# Patient Record
Sex: FEMALE | Race: WHITE | NOT HISPANIC OR LATINO | Employment: OTHER
[De-identification: names, ages, dates, MRNs, and addresses within clinical notes are randomized per-mention and may not be internally consistent; named-entity substitution may affect disease eponyms.]

---

## 2020-03-02 ENCOUNTER — HEALTH MAINTENANCE LETTER (OUTPATIENT)
Age: 75
End: 2020-03-02

## 2020-12-14 ENCOUNTER — HEALTH MAINTENANCE LETTER (OUTPATIENT)
Age: 75
End: 2020-12-14

## 2021-04-18 ENCOUNTER — HEALTH MAINTENANCE LETTER (OUTPATIENT)
Age: 76
End: 2021-04-18

## 2021-10-02 ENCOUNTER — HEALTH MAINTENANCE LETTER (OUTPATIENT)
Age: 76
End: 2021-10-02

## 2022-03-19 ENCOUNTER — HEALTH MAINTENANCE LETTER (OUTPATIENT)
Age: 77
End: 2022-03-19

## 2022-05-14 ENCOUNTER — HEALTH MAINTENANCE LETTER (OUTPATIENT)
Age: 77
End: 2022-05-14

## 2022-09-03 ENCOUNTER — HEALTH MAINTENANCE LETTER (OUTPATIENT)
Age: 77
End: 2022-09-03

## 2023-06-03 ENCOUNTER — HEALTH MAINTENANCE LETTER (OUTPATIENT)
Age: 78
End: 2023-06-03

## 2024-07-06 ENCOUNTER — HEALTH MAINTENANCE LETTER (OUTPATIENT)
Age: 79
End: 2024-07-06

## 2024-10-16 ENCOUNTER — HOSPITAL ENCOUNTER (EMERGENCY)
Facility: HOSPITAL | Age: 79
Discharge: HOME | End: 2024-10-16
Attending: EMERGENCY MEDICINE | Admitting: EMERGENCY MEDICINE
Payer: COMMERCIAL

## 2024-10-16 VITALS
DIASTOLIC BLOOD PRESSURE: 56 MMHG | RESPIRATION RATE: 23 BRPM | TEMPERATURE: 97.3 F | WEIGHT: 170 LBS | SYSTOLIC BLOOD PRESSURE: 123 MMHG | HEART RATE: 79 BPM | OXYGEN SATURATION: 94 %

## 2024-10-16 DIAGNOSIS — I48.91 NEW ONSET ATRIAL FIBRILLATION (H): ICD-10-CM

## 2024-10-16 LAB
ANION GAP SERPL CALCULATED.3IONS-SCNC: 13 MMOL/L (ref 7–15)
BASOPHILS # BLD AUTO: 0 10E3/UL (ref 0–0.2)
BASOPHILS NFR BLD AUTO: 0 %
BUN SERPL-MCNC: 11.4 MG/DL (ref 8–23)
CALCIUM SERPL-MCNC: 9.4 MG/DL (ref 8.8–10.4)
CHLORIDE SERPL-SCNC: 102 MMOL/L (ref 98–107)
CREAT SERPL-MCNC: 0.96 MG/DL (ref 0.51–0.95)
EGFRCR SERPLBLD CKD-EPI 2021: 61 ML/MIN/1.73M2
EOSINOPHIL # BLD AUTO: 0.1 10E3/UL (ref 0–0.7)
EOSINOPHIL NFR BLD AUTO: 1 %
ERYTHROCYTE [DISTWIDTH] IN BLOOD BY AUTOMATED COUNT: 13.8 % (ref 10–15)
GLUCOSE SERPL-MCNC: 111 MG/DL (ref 70–99)
HCO3 SERPL-SCNC: 25 MMOL/L (ref 22–29)
HCT VFR BLD AUTO: 40.3 % (ref 35–47)
HGB BLD-MCNC: 12.9 G/DL (ref 11.7–15.7)
IMM GRANULOCYTES # BLD: 0.1 10E3/UL
IMM GRANULOCYTES NFR BLD: 1 %
LYMPHOCYTES # BLD AUTO: 2 10E3/UL (ref 0.8–5.3)
LYMPHOCYTES NFR BLD AUTO: 18 %
MAGNESIUM SERPL-MCNC: 2 MG/DL (ref 1.7–2.3)
MCH RBC QN AUTO: 29.5 PG (ref 26.5–33)
MCHC RBC AUTO-ENTMCNC: 32 G/DL (ref 31.5–36.5)
MCV RBC AUTO: 92 FL (ref 78–100)
MONOCYTES # BLD AUTO: 1.2 10E3/UL (ref 0–1.3)
MONOCYTES NFR BLD AUTO: 11 %
NEUTROPHILS # BLD AUTO: 7.5 10E3/UL (ref 1.6–8.3)
NEUTROPHILS NFR BLD AUTO: 69 %
NRBC # BLD AUTO: 0 10E3/UL
NRBC BLD AUTO-RTO: 0 /100
PLATELET # BLD AUTO: 258 10E3/UL (ref 150–450)
POTASSIUM SERPL-SCNC: 4.2 MMOL/L (ref 3.4–5.3)
RBC # BLD AUTO: 4.38 10E6/UL (ref 3.8–5.2)
SODIUM SERPL-SCNC: 140 MMOL/L (ref 135–145)
TROPONIN T SERPL HS-MCNC: <6 NG/L
TSH SERPL DL<=0.005 MIU/L-ACNC: 3.06 UIU/ML (ref 0.3–4.2)
WBC # BLD AUTO: 10.8 10E3/UL (ref 4–11)

## 2024-10-16 PROCEDURE — 84484 ASSAY OF TROPONIN QUANT: CPT | Performed by: EMERGENCY MEDICINE

## 2024-10-16 PROCEDURE — 83735 ASSAY OF MAGNESIUM: CPT | Performed by: EMERGENCY MEDICINE

## 2024-10-16 PROCEDURE — 85004 AUTOMATED DIFF WBC COUNT: CPT | Performed by: EMERGENCY MEDICINE

## 2024-10-16 PROCEDURE — 99284 EMERGENCY DEPT VISIT MOD MDM: CPT

## 2024-10-16 PROCEDURE — 36415 COLL VENOUS BLD VENIPUNCTURE: CPT | Performed by: EMERGENCY MEDICINE

## 2024-10-16 PROCEDURE — 93005 ELECTROCARDIOGRAM TRACING: CPT | Performed by: EMERGENCY MEDICINE

## 2024-10-16 PROCEDURE — 93005 ELECTROCARDIOGRAM TRACING: CPT | Performed by: STUDENT IN AN ORGANIZED HEALTH CARE EDUCATION/TRAINING PROGRAM

## 2024-10-16 PROCEDURE — 84443 ASSAY THYROID STIM HORMONE: CPT | Performed by: EMERGENCY MEDICINE

## 2024-10-16 PROCEDURE — 80048 BASIC METABOLIC PNL TOTAL CA: CPT | Performed by: EMERGENCY MEDICINE

## 2024-10-16 RX ORDER — METOPROLOL TARTRATE 25 MG/1
12.5 TABLET, FILM COATED ORAL 2 TIMES DAILY
Qty: 60 TABLET | Refills: 0 | Status: SHIPPED | OUTPATIENT
Start: 2024-10-16 | End: 2024-10-25

## 2024-10-16 ASSESSMENT — ACTIVITIES OF DAILY LIVING (ADL): ADLS_ACUITY_SCORE: 35

## 2024-10-16 ASSESSMENT — COLUMBIA-SUICIDE SEVERITY RATING SCALE - C-SSRS
6. HAVE YOU EVER DONE ANYTHING, STARTED TO DO ANYTHING, OR PREPARED TO DO ANYTHING TO END YOUR LIFE?: NO
1. IN THE PAST MONTH, HAVE YOU WISHED YOU WERE DEAD OR WISHED YOU COULD GO TO SLEEP AND NOT WAKE UP?: NO
2. HAVE YOU ACTUALLY HAD ANY THOUGHTS OF KILLING YOURSELF IN THE PAST MONTH?: NO

## 2024-10-16 NOTE — ED PROVIDER NOTES
EMERGENCY DEPARTMENT ENCOUNTER      NAME: Linda K Myhre  AGE: 79 year old female  YOB: 1945  MRN: [id number]  EVALUATION DATE & TIME: 10/16/2024  3:08 PM    PCP: No Ref-Primary, Physician    ED PROVIDER: Chris Garcia M.D.      Chief Complaint   Patient presents with    Atrial Fib       FINAL IMPRESSION:  1. New onset atrial fibrillation (H)        ED COURSE & MEDICAL DECISION MAKIN year old female presents to the Emergency Department for evaluation of atrial fibrillation.  Patient presenting with palpitations and episode of lightheadedness earlier.  She is initially somewhat tachycardic and EKG shows atrial fibrillation.  Patient underwent lab evaluations which included metabolic profile, thyroid testing, high-sensitivity troponin, all of this has been reassuring so far.  When placed on cardiac monitor, her overall heart rate trend is controlled.  We discussed starting her on a very  low-dose of metoprolol given the intermittent tachycardia as well as initiating anticoagulation with Eliquis.  A cardiology referral was placed.  Patient otherwise stable here throughout the duration of her workup and monitoring.  She was discharged in stable condition.    3:04 PM I met with the patient, obtained history, performed an initial exam, and discussed options and plan for diagnostics and treatment here in the ED.    At the conclusion of the encounter I discussed the results of all of the tests and the disposition. The questions were answered. The patient or family acknowledged understanding and was agreeable with the care plan.       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 Mask placed on patient in first look triage. Triage staff wearing masks.      independently?: Independent interpretation of ECG and images noted in documentation, when applicable.  Consultation discussion with other provider:Did you involve another provider (consultant, , pharmacy, etc.)?: No  Discharge. I prescribed additional prescription strength medication(s) as charted. N/A.    MIPS: Not Applicable          MEDICATIONS GIVEN IN THE EMERGENCY:  Medications - No data to display    NEW PRESCRIPTIONS STARTED AT TODAY'S ER VISIT  Discharge Medication List as of 10/16/2024  4:20 PM        START taking these medications    Details   apixaban ANTICOAGULANT (ELIQUIS ANTICOAGULANT) 5 MG tablet Take 1 tablet (5 mg) by mouth 2 times daily., Disp-60 tablet, R-0, Local Print      metoprolol tartrate (LOPRESSOR) 25 MG tablet Take 0.5 tablets (12.5 mg) by mouth 2 times daily., Disp-60 tablet, R-0, Local Print                =================================================================    HPI    Patient information was obtained from: Patient    Use of : N/A        Linda K Myhre is a 79 year old female with no pertinent history who presents to this ED via EMS for evaluation of atrial fibrillation.     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 She reports a potential loss of consciousness, stating all of a sudden she woke up with her fellow volunteers surrounding her. Patient reports feeling at baseline now, and denies any history of heart problems, other than two instances of Chorea [place] when she was a child.     Patient denies history of heavy drinking or any other complaints at this time.       REVIEW OF SYSTEMS   All systems reviewed and negative except as noted in HPI.    PAST MEDICAL HISTORY:  No past medical history on file.    PAST SURGICAL HISTORY:  No past surgical history on file.        CURRENT MEDICATIONS:    No current facility-administered medications for this encounter.     Current  Outpatient Medications   Medication Sig Dispense Refill    apixaban ANTICOAGULANT (ELIQUIS ANTICOAGULANT) 5 MG tablet Take 1 tablet (5 mg) by mouth 2 times daily. 60 tablet 0    metoprolol tartrate (LOPRESSOR) 25 MG tablet Take 0.5 tablets (12.5 mg) by mouth 2 times daily. 60 tablet 0         ALLERGIES:  No Known Allergies    FAMILY HISTORY:  No family history on file.    SOCIAL HISTORY:   Social History     Socioeconomic History    Marital status:    Tobacco Use    Smoking status: Former   Substance and Sexual Activity    Alcohol use: No    Drug use: No       VITALS:  /56   Pulse 79   Temp 97.3  F (36.3  C)   Resp 23   Wt 77.1 kg (170 lb)   SpO2 94%     PHYSICAL EXAM    Constitutional: Well developed, Well nourished, NAD.  HENT: Normocephalic, Atraumatic. Neck Supple.  Eyes: EOMI, Conjunctiva normal.  Respiratory: Breathing comfortably on room air. Speaks full sentences easily. Lungs clear to ascultation.  Cardiovascular: Irregular heart rate, Irregular rhythm. No peripheral edema.  Abdomen: Soft  Musculoskeletal: Good range of motion in all major joints. No major deformities noted.  Integument: Warm, Dry.  Neurologic: Alert & awake, Normal motor function, Normal sensory function, No focal deficits noted.   Psychiatric: Cooperative. Affect appropriate.     LAB:  All pertinent labs reviewed and interpreted.  Labs Ordered and Resulted from Time of ED Arrival to Time of ED Departure   BASIC METABOLIC PANEL - Abnormal       Result Value    Sodium 140      Potassium 4.2      Chloride 102      Carbon Dioxide (CO2) 25      Anion Gap 13      Urea Nitrogen 11.4      Creatinine 0.96 (*)     GFR Estimate 61      Calcium 9.4      Glucose 111 (*)    TROPONIN T, HIGH SENSITIVITY - Normal    Troponin T, High Sensitivity <6     TSH WITH FREE T4 REFLEX - Normal    TSH 3.06     MAGNESIUM - Normal    Magnesium 2.0     CBC WITH PLATELETS AND DIFFERENTIAL    WBC Count 10.8      RBC Count 4.38      Hemoglobin 12.9       Hematocrit 40.3      MCV 92      MCH 29.5      MCHC 32.0      RDW 13.8      Platelet Count 258      % Neutrophils 69      % Lymphocytes 18      % Monocytes 11      % Eosinophils 1      % Basophils 0      % Immature Granulocytes 1      NRBCs per 100 WBC 0      Absolute Neutrophils 7.5      Absolute Lymphocytes 2.0      Absolute Monocytes 1.2      Absolute Eosinophils 0.1      Absolute Basophils 0.0      Absolute Immature Granulocytes 0.1      Absolute NRBCs 0.0           EKG:    Performed at: 1442    Impression: Atrial fibrillation with RVR, possible anterior infarct pattern, age-indeterminate    Rate: 103  Rhythm: Afib  Axis: Normal  MO Interval: NA  QRS Interval: 92  QTc Interval: 445  ST Changes: None  Comparison: None available    I have independently reviewed and interpreted the EKG(s) documented above.      I, Antwon Khoury, am serving as a scribe to document services personally performed by Dr. Chris Garcia, based on my observation and the provider's statements to me. I, Chris Garcia MD attest that Antwon Khoury is acting in a scribe capacity, has observed my performance of the services and has documented them in accordance with my direction.    Chris Garcia M.D.  Emergency Medicine  [facility] EMERGENCY DEPARTMENT  [facility] [facility] [id number] [phone]  Dept: [phone]       Chris Garcia MD  10/16/24 5266

## 2024-10-16 NOTE — ED TRIAGE NOTES
Pt had sudden onset of feeling hot, dizzy and had near syncope. Pt found per EMS to be in Afib RVR. No previous hx of.      Triage Assessment (Adult)       Row Name 10/16/24 3231          Triage Assessment    Airway WDL WDL        Respiratory WDL    Respiratory WDL WDL        Skin Circulation/Temperature WDL    Skin Circulation/Temperature WDL WDL        Cardiac WDL    Cardiac WDL X        Peripheral/Neurovascular WDL    Peripheral Neurovascular WDL WDL

## 2024-10-16 NOTE — DISCHARGE INSTRUCTIONS
You were seen in the emergency department for palpitations and lightheadedness and found to have new onset of atrial fibrillation.  Your evaluation otherwise with lab testing here looks stable.  Your heart rate at this time is stable.  We discussed that we are going to start you on a low-dose of medication to help manage heart rate as well as a blood thinner to decrease stroke risk associated with atrial fibrillation.  We are going to place a referral to the cardiology clinic for follow-up as soon as possible.  Please avoid caffeine and alcohol.  Make sure you are drinking plenty of liquids to keep yourself well hydrated.  If you have any concerns like severe sustained lightheadedness, severe evolving chest pain, etc. we can reevaluate anytime in the emergency department.

## 2024-10-25 ENCOUNTER — OFFICE VISIT (OUTPATIENT)
Dept: CARDIOLOGY | Facility: CLINIC | Age: 79
End: 2024-10-25
Attending: EMERGENCY MEDICINE
Payer: COMMERCIAL

## 2024-10-25 VITALS
RESPIRATION RATE: 22 BRPM | HEART RATE: 75 BPM | SYSTOLIC BLOOD PRESSURE: 138 MMHG | BODY MASS INDEX: 27.48 KG/M2 | HEIGHT: 66 IN | WEIGHT: 171 LBS | DIASTOLIC BLOOD PRESSURE: 68 MMHG

## 2024-10-25 DIAGNOSIS — I48.91 NEW ONSET ATRIAL FIBRILLATION (H): Primary | ICD-10-CM

## 2024-10-25 PROCEDURE — 99204 OFFICE O/P NEW MOD 45 MIN: CPT | Performed by: INTERNAL MEDICINE

## 2024-10-25 PROCEDURE — 93242 EXT ECG>48HR<7D RECORDING: CPT | Performed by: INTERNAL MEDICINE

## 2024-10-25 PROCEDURE — G2211 COMPLEX E/M VISIT ADD ON: HCPCS | Performed by: INTERNAL MEDICINE

## 2024-10-25 RX ORDER — METOPROLOL TARTRATE 25 MG/1
25 TABLET, FILM COATED ORAL 2 TIMES DAILY
Qty: 60 TABLET | Refills: 11 | Status: SHIPPED | OUTPATIENT
Start: 2024-10-25

## 2024-10-25 NOTE — NURSING NOTE
Linda K Myhre arrived here on 10/25/2024 10:53 AM for 3-7 Days  Zio monitor placement per ordering provider Dr. Calderon for the diagnosis New onset atrial fibrillation (H) [I48.91] .  Patient s skin was prepped per protocol. Dr. Calderon is the supervising MD.  Zio monitor was placed.  Instructions were reviewed with and given to the patient.  Patient verbalized understanding of wear, troubleshooting and monitor return instructions.

## 2024-10-25 NOTE — LETTER
10/25/2024    Physician No Ref-Primary  No address on file    RE: Linda K Myhre       Dear Colleague,     I had the pleasure of seeing Linda K Myhre in the [facility].      Click to link to [facility] HEART CARE NOTE    Thank you, Dr. Garcia, for asking me to see Linda K Myhre in consultation at [facility] to evaluate .      Assessment/Plan:   New onset atrial fibrillation: The patient has no palpitations.  Her atrial fibrillation symptoms include fatigue, mild shortness of breath, dizziness.  The etiology of her atrial fibrillation could be related to recent lung infection possible viral versus bacteria.  Her TSH is normal.  She has no chronic lung disease, no history of heart disease.  We discussed the evaluation and management of atrial fibrillation.  Since she had a syncopal episode on October 16 and new onset atrial fibrillation, Zio patch monitor 7 days is requested for evaluation of atrial fibrillation burden, ventricular rate response.  Meantime, start metoprolol titrate 25 mg twice a day for better rate control.  Echocardiogram is requested for evaluation of heart function and structure. Her [id number] score is 3 due to age and female.  Continue Eliquis 5 mg twice a day.  We discussed the side effect and the benefit of chronic anticoagulation.  We also discussed alternative prevention of stroke by Watchman device.  We discussed the management of atrial fibrillation including ablation.  Based on Zio patch monitor and echo findings, we will further discuss the management of atrial fibrillation.    We will follow-up her Zio patch monitor and echo report, discuss the findings with the patient.    Thank you for the opportunity to be involved in the care of Linda K Myhre. If you have any questions, please feel free to contact me.  I will see the patient again in 2 months and as needed    Much or all of the text in this note was generated  through the use of Dragon Dictate voice-to-text software. Errors in spelling or words which seem out of context are unintentional.   Sound alike errors, in particular, may have escaped editing.       History of Present Illness:   It is my pleasure to see Linda K Myhre at the [facility] clinic for evaluation of New Patient. Linda K Myhre is a 79 year old female with a medical history of new onset atrial fibrillation.    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  Suddenly she developed lightheadedness, dizziness, fatigue and shortness of breath.  And then she passed out for short time.  The patient was brought to emergency room and found atrial fibrillation with RVR.    She got viral infection 10 days ago.  She developed a cough.  She denies chest pain.  She had no shortness of breath on exertion, palpitations.  She had no orthopnea, PND or leg edema.  Her blood pressure and heart rate are controlled today.  She has been started blood thinner Eliquis of 5 mg twice a day.    Past Medical History:     Patient Active Problem List   Diagnosis     Hemorrhoids       Past Surgical History:   No past surgical history on file.    Family History:   Reviewed: Family history of coronary artery disease, no family history of atrial fibrillation.    Social History:    reports that she has quit smoking. She does not have any smokeless tobacco history on file. She reports that she does not drink alcohol and does not use drugs.    Review of Systems:   12 systems are reviewed negative except for in HPI.    Meds:     Current Outpatient Medications:      apixaban ANTICOAGULANT (ELIQUIS ANTICOAGULANT) 5 MG tablet, Take 1 tablet (5 mg) by mouth 2 times daily., Disp: 60 tablet, Rfl: 11     metoprolol tartrate (LOPRESSOR) 25 MG tablet, Take 1 tablet (25 mg) by mouth 2 times daily., Disp: 60 tablet, Rfl: 11     Allergies:   Patient has no known allergies.    Objective:      Physical Exam  77.6 kg (171 lb)  1.676 m (5'  "6\")  Body mass index is 27.6 kg/m .  /68 (BP Location: Right arm, Patient Position: Sitting, Cuff Size: Adult Regular)   Pulse 75   Resp 22   Ht 1.676 m (5' 6\")   Wt 77.6 kg (171 lb)   BMI 27.60 kg/m      General Appearance:   Awake, Alert, No acute distress.   HEENT:  Pupil equal, reactive to light. No scleral icterus; the mucous membranes were moist. No oral ulcers or thrush.    Neck: No cervical bruits. No JVD. No thyromegaly. No lymph node enlargement or tenderness.   Chest: The spine was straight. The chest was symmetric.   Lungs:   Respirations unlabored. Lungs are clear to auscultation. No crackles. No wheezing.   Cardiovascular:   IRRR, normal first and second heart sounds with no murmurs. No rubs or gallops.    Abdomen:  Soft. No tenderness. Non-distended. Bowels sounds are present   Extremities: Equal posterior tibial pulses. No leg edema.   Skin: No rashes or ulcers. Warm, Dry.   Musculoskeletal: No tenderness. No deformity.   Neurologic: Mood and affect are appropriate. No focal deficits.         EKG:  Personally reivewed  Atrial fibrillation with rapid ventricular response  Possible anterior infarct, age undetermined  Abnormal ECG  No previous ECG for comparison    Cardiac Imaging Studies       Lab Review   Lab Results   Component Value Date     10/16/2024    .0 04/09/2013    CO2 25 10/16/2024    CO2 29.0 04/09/2013    BUN 11.4 10/16/2024    BUN 14.0 04/09/2013     Lab Results   Component Value Date    WBC 10.8 10/16/2024    HGB 12.9 10/16/2024    HCT 40.3 10/16/2024    MCV 92 10/16/2024     10/16/2024     Lab Results   Component Value Date    CHOL 189 05/02/2012    HDL 59 05/02/2012     05/02/2012     LDL Cholesterol Calculated   Date Value Ref Range Status   05/02/2012 112 <130 mg/dL Final     Lab Results   Component Value Date    TSH 3.06 10/16/2024                   Thank you for allowing me to participate in the care of your patient.      Sincerely,     Janie" MD Yumiko     [facility]  cc:   Chris Garcia MD  [address] [place] [id number]

## 2024-10-25 NOTE — PROGRESS NOTES
Click to link to [facility] HEART CARE NOTE    Thank you, Dr. Garcia, for asking me to see Linda K Myhre in consultation at [facility] to evaluate .      Assessment/Plan:   New onset atrial fibrillation: The patient has no palpitations.  Her atrial fibrillation symptoms include fatigue, mild shortness of breath, dizziness.  The etiology of her atrial fibrillation could be related to recent lung infection possible viral versus bacteria.  Her TSH is normal.  She has no chronic lung disease, no history of heart disease.  We discussed the evaluation and management of atrial fibrillation.  Since she had a syncopal episode on October 16 and new onset atrial fibrillation, Zio patch monitor 7 days is requested for evaluation of atrial fibrillation burden, ventricular rate response.  Meantime, start metoprolol titrate 25 mg twice a day for better rate control.  Echocardiogram is requested for evaluation of heart function and structure. Her [id number] score is 3 due to age and female.  Continue Eliquis 5 mg twice a day.  We discussed the side effect and the benefit of chronic anticoagulation.  We also discussed alternative prevention of stroke by Watchman device.  We discussed the management of atrial fibrillation including ablation.  Based on Zio patch monitor and echo findings, we will further discuss the management of atrial fibrillation.    We will follow-up her Zio patch monitor and echo report, discuss the findings with the patient.    Thank you for the opportunity to be involved in the care of Linda K Myhre. If you have any questions, please feel free to contact me.  I will see the patient again in 2 months and as needed    Much or all of the text in this note was generated through the use of Dragon Dictate voice-to-text software. Errors in spelling or words which seem out of context are unintentional.   Sound alike errors, in particular, may have escaped  "editing.       History of Present Illness:   It is my pleasure to see Linda K Myhre at the [facility] clinic for evaluation of New Patient. Linda K Myhre is a 79 year old female with a medical history of new onset atrial fibrillation.    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  Suddenly she developed lightheadedness, dizziness, fatigue and shortness of breath.  And then she passed out for short time.  The patient was brought to emergency room and found atrial fibrillation with RVR.    She got viral infection 10 days ago.  She developed a cough.  She denies chest pain.  She had no shortness of breath on exertion, palpitations.  She had no orthopnea, PND or leg edema.  Her blood pressure and heart rate are controlled today.  She has been started blood thinner Eliquis of 5 mg twice a day.    Past Medical History:     Patient Active Problem List   Diagnosis    Hemorrhoids       Past Surgical History:   No past surgical history on file.    Family History:   Reviewed: Family history of coronary artery disease, no family history of atrial fibrillation.    Social History:    reports that she has quit smoking. She does not have any smokeless tobacco history on file. She reports that she does not drink alcohol and does not use drugs.    Review of Systems:   12 systems are reviewed negative except for in HPI.    Meds:     Current Outpatient Medications:     apixaban ANTICOAGULANT (ELIQUIS ANTICOAGULANT) 5 MG tablet, Take 1 tablet (5 mg) by mouth 2 times daily., Disp: 60 tablet, Rfl: 11    metoprolol tartrate (LOPRESSOR) 25 MG tablet, Take 1 tablet (25 mg) by mouth 2 times daily., Disp: 60 tablet, Rfl: 11     Allergies:   Patient has no known allergies.    Objective:      Physical Exam  77.6 kg (171 lb)  1.676 m (5' 6\")  Body mass index is 27.6 kg/m .  /68 (BP Location: Right arm, Patient Position: Sitting, Cuff Size: Adult Regular)   Pulse 75   Resp 22   Ht 1.676 m (5' 6\")   Wt 77.6 kg (171 " lb)   BMI 27.60 kg/m      General Appearance:   Awake, Alert, No acute distress.   HEENT:  Pupil equal, reactive to light. No scleral icterus; the mucous membranes were moist. No oral ulcers or thrush.    Neck: No cervical bruits. No JVD. No thyromegaly. No lymph node enlargement or tenderness.   Chest: The spine was straight. The chest was symmetric.   Lungs:   Respirations unlabored. Lungs are clear to auscultation. No crackles. No wheezing.   Cardiovascular:   IRRR, normal first and second heart sounds with no murmurs. No rubs or gallops.    Abdomen:  Soft. No tenderness. Non-distended. Bowels sounds are present   Extremities: Equal posterior tibial pulses. No leg edema.   Skin: No rashes or ulcers. Warm, Dry.   Musculoskeletal: No tenderness. No deformity.   Neurologic: Mood and affect are appropriate. No focal deficits.         EKG:  Personally reivewed  Atrial fibrillation with rapid ventricular response  Possible anterior infarct, age undetermined  Abnormal ECG  No previous ECG for comparison    Cardiac Imaging Studies       Lab Review   Lab Results   Component Value Date     10/16/2024    .0 04/09/2013    CO2 25 10/16/2024    CO2 29.0 04/09/2013    BUN 11.4 10/16/2024    BUN 14.0 04/09/2013     Lab Results   Component Value Date    WBC 10.8 10/16/2024    HGB 12.9 10/16/2024    HCT 40.3 10/16/2024    MCV 92 10/16/2024     10/16/2024     Lab Results   Component Value Date    CHOL 189 05/02/2012    HDL 59 05/02/2012     05/02/2012     LDL Cholesterol Calculated   Date Value Ref Range Status   05/02/2012 112 <130 mg/dL Final     Lab Results   Component Value Date    TSH 3.06 10/16/2024

## 2024-11-08 PROCEDURE — 93248 EXT ECG>7D<15D REV&INTERPJ: CPT | Performed by: INTERNAL MEDICINE

## 2024-11-10 LAB
ATRIAL RATE - MUSE: 94 BPM
DIASTOLIC BLOOD PRESSURE - MUSE: NORMAL MMHG
INTERPRETATION ECG - MUSE: NORMAL
P AXIS - MUSE: NORMAL DEGREES
PR INTERVAL - MUSE: NORMAL MS
QRS DURATION - MUSE: 92 MS
QT - MUSE: 340 MS
QTC - MUSE: 445 MS
R AXIS - MUSE: -11 DEGREES
SYSTOLIC BLOOD PRESSURE - MUSE: NORMAL MMHG
T AXIS - MUSE: 30 DEGREES
VENTRICULAR RATE- MUSE: 103 BPM

## 2024-11-22 ENCOUNTER — HOSPITAL ENCOUNTER (OUTPATIENT)
Dept: CARDIOLOGY | Facility: HOSPITAL | Age: 79
Discharge: HOME | End: 2024-11-22
Attending: INTERNAL MEDICINE | Admitting: INTERNAL MEDICINE
Payer: COMMERCIAL

## 2024-11-22 DIAGNOSIS — I48.91 NEW ONSET ATRIAL FIBRILLATION (H): ICD-10-CM

## 2024-11-22 LAB — LVEF ECHO: NORMAL

## 2024-11-22 PROCEDURE — 93306 TTE W/DOPPLER COMPLETE: CPT | Mod: 26 | Performed by: INTERNAL MEDICINE

## 2024-11-22 PROCEDURE — 255N000002 HC RX 255 OP 636: Performed by: INTERNAL MEDICINE

## 2024-11-22 RX ADMIN — PERFLUTREN 2 ML: 6.52 INJECTION, SUSPENSION INTRAVENOUS at 09:47

## 2024-12-17 ENCOUNTER — OFFICE VISIT (OUTPATIENT)
Dept: CARDIOLOGY | Facility: CLINIC | Age: 79
End: 2024-12-17
Attending: INTERNAL MEDICINE
Payer: COMMERCIAL

## 2024-12-17 VITALS
HEART RATE: 64 BPM | WEIGHT: 175 LBS | SYSTOLIC BLOOD PRESSURE: 152 MMHG | BODY MASS INDEX: 28.12 KG/M2 | DIASTOLIC BLOOD PRESSURE: 62 MMHG | RESPIRATION RATE: 16 BRPM | HEIGHT: 66 IN

## 2024-12-17 DIAGNOSIS — R93.1 ABNORMAL ECHOCARDIOGRAM: ICD-10-CM

## 2024-12-17 DIAGNOSIS — I48.91 NEW ONSET ATRIAL FIBRILLATION (H): Primary | ICD-10-CM

## 2024-12-17 DIAGNOSIS — I25.5 ISCHEMIC CARDIOMYOPATHY: ICD-10-CM

## 2024-12-17 PROCEDURE — 99214 OFFICE O/P EST MOD 30 MIN: CPT | Performed by: INTERNAL MEDICINE

## 2024-12-17 NOTE — PROGRESS NOTES
Click to link to [facility] HEART CARE NOTE     Assessment/Plan:   New onset atrial fibrillation: The patient has no palpitations.  She is in persistent atrial fibrillation with controlled ventricular rate based on Zio patch monitor.  Echo was reported normal left ventricular size and systolic function, wall motion abnormality in distal LAD distribution.  We discussed the evaluation and management.  We discussed ablation of atrial fibrillation.  Since she has segmental wall motion abnormality, stress cardiac MRI is requested for further evaluation prior to considering ablation of atrial fibrillation.  Continue metoprolol 25 mg twice a day. Her [id number] score is 3 due to age and female.  Continue Eliquis 5 mg twice a day.  Abnormal echo findings, segmental wall motion abnormality in LAD distribution: The patient has no chest pain.  Her exertional shortness of breath could be anginal equivalent.  We discussed the findings of echo.  After discussion, stress cardiac MRI is requested for evaluation of myocardial ischemia, myocardial viability, other etiology of cardiomyopathy.  Depend on stress cardiac MRI findings, we will discuss next step.    Thank you for the opportunity to be involved in the care of Linda K Myhre. If you have any questions, please feel free to contact me.  I will see the patient again in 2 months and as needed    Much or all of the text in this note was generated through the use of Dragon Dictate voice-to-text software. Errors in spelling or words which seem out of context are unintentional.   Sound alike errors, in particular, may have escaped editing.       History of Present Illness:   It is my pleasure to see Linda K Myhre at the [facility] clinic for routine cardiology follow-up. Linda K Myhre is a 79 year old female with a medical history of new onset atrial fibrillation.    The patient states that she has been doing well since last visit  "2 months ago.  She has occasional exertional shortness of breath.  She denies chest pain, palpitations, dizziness, lightheadedness, syncope, orthopnea, PND or leg edema.  Her blood pressure and heart rate are controlled.    Her Zio patch monitor was reported continues atrial fibrillation with controlled ventricular rate.  Echocardiogram was reported normal left ventricular size, LVEF of 60 to 65%, significant hypokinesis in distal anteroseptal and apical segments, mild aortic valve regurgitation.    Past Medical History:     Patient Active Problem List   Diagnosis    Hemorrhoids    New onset atrial fibrillation (H)       Past Surgical History:   No past surgical history on file.    Family History:   Reviewed: Family history of coronary artery disease, no family history of atrial fibrillation.    Social History:    reports that she has quit smoking. She does not have any smokeless tobacco history on file. She reports that she does not drink alcohol and does not use drugs.    Review of Systems:   12 systems are reviewed negative except for in HPI.    Meds:     Current Outpatient Medications:     apixaban ANTICOAGULANT (ELIQUIS ANTICOAGULANT) 5 MG tablet, Take 1 tablet (5 mg) by mouth 2 times daily., Disp: 60 tablet, Rfl: 11    metoprolol tartrate (LOPRESSOR) 25 MG tablet, Take 1 tablet (25 mg) by mouth 2 times daily., Disp: 60 tablet, Rfl: 11     Allergies:   Patient has no known allergies.    Objective:      Physical Exam  79.4 kg (175 lb)  1.676 m (5' 6\")  Body mass index is 28.25 kg/m .  BP (!) 152/62 (BP Location: Right arm, Patient Position: Sitting, Cuff Size: Adult Regular)   Pulse 64   Resp 16   Ht 1.676 m (5' 6\")   Wt 79.4 kg (175 lb)   BMI 28.25 kg/m      General Appearance:   Awake, Alert, No acute distress.   HEENT:  Pupil equal, reactive to light. No scleral icterus; the mucous membranes were moist. No oral ulcers or thrush.    Neck: No cervical bruits. No JVD. No thyromegaly. No lymph node " enlargement or tenderness.   Chest: The spine was straight. The chest was symmetric.   Lungs:   Respirations unlabored. Lungs are clear to auscultation. No crackles. No wheezing.   Cardiovascular:   IRRR, normal first and second heart sounds with no murmurs. No rubs or gallops.    Abdomen:  Soft. No tenderness. Non-distended. Bowels sounds are present   Extremities: Equal posterior tibial pulses. No leg edema.   Skin: No rashes or ulcers. Warm, Dry.   Musculoskeletal: No tenderness. No deformity.   Neurologic: Mood and affect are appropriate. No focal deficits.         EKG:  Personally reivewed  Atrial fibrillation with rapid ventricular response  Possible anterior infarct, age undetermined  Abnormal ECG  No previous ECG for comparison    Cardiac Imaging Studies  Echo on November 22, 2024:  1.Left ventricular size and function are normal. The ejection  fraction is 60-65%.  2.There is apical akinesis.  3.Normal right ventricle size and systolic function.  4.The left atrium is borderline dilated.  5.No hemodynamically significant valvular abnormalities on 2D or color flow  imaging, mild leaks as below.  There is no comparison study available.    Zio patch monitor on November 8, 2024:  Zio monitoring from 10/25/2024 to 11/1/2024 (duration 7d 12h).  Continuous atrial fibrillation, ventricular rates 41 to 168bpm, average 69bpm.  Intermittent IVCD.  There were no pauses of greater than 3 seconds.  Rare premature ventricular contractions (isolated <1%).  Symptom triggers (2) correlated to atrial fibrillation with ventricular rate 61-114bpm.    Lab Review   Lab Results   Component Value Date     10/16/2024    .0 04/09/2013    CO2 25 10/16/2024    CO2 29.0 04/09/2013    BUN 11.4 10/16/2024    BUN 14.0 04/09/2013     Lab Results   Component Value Date    WBC 10.8 10/16/2024    HGB 12.9 10/16/2024    HCT 40.3 10/16/2024    MCV 92 10/16/2024     10/16/2024     Lab Results   Component Value Date    CHOL 189  05/02/2012    HDL 59 05/02/2012     05/02/2012     LDL Cholesterol Calculated   Date Value Ref Range Status   05/02/2012 112 <130 mg/dL Final     Lab Results   Component Value Date    TSH 3.06 10/16/2024

## 2024-12-17 NOTE — LETTER
12/17/2024    Physician No Ref-Primary  No address on file    RE: Linda K Myhre       Dear Colleague,     I had the pleasure of seeing Linda K Myhre in the [facility].      Click to link to [facility] NOTE     Assessment/Plan:   New onset atrial fibrillation: The patient has no palpitations.  She is in persistent atrial fibrillation with controlled ventricular rate based on Zio patch monitor.  Echo was reported normal left ventricular size and systolic function, wall motion abnormality in distal LAD distribution.  We discussed the evaluation and management.  We discussed ablation of atrial fibrillation.  Since she has segmental wall motion abnormality, stress cardiac MRI is requested for further evaluation prior to considering ablation of atrial fibrillation.  Continue metoprolol 25 mg twice a day. Her [id number] score is 3 due to age and female.  Continue Eliquis 5 mg twice a day.  Abnormal echo findings, segmental wall motion abnormality in LAD distribution: The patient has no chest pain.  Her exertional shortness of breath could be anginal equivalent.  We discussed the findings of echo.  After discussion, stress cardiac MRI is requested for evaluation of myocardial ischemia, myocardial viability, other etiology of cardiomyopathy.  Depend on stress cardiac MRI findings, we will discuss next step.    Thank you for the opportunity to be involved in the care of Linda K Myhre. If you have any questions, please feel free to contact me.  I will see the patient again in 2 months and as needed    Much or all of the text in this note was generated through the use of Dragon Dictate voice-to-text software. Errors in spelling or words which seem out of context are unintentional.   Sound alike errors, in particular, may have escaped editing.       History of Present Illness:   It is my pleasure to see Linda K Myhre at the [facility] for  "routine cardiology follow-up. Linda K Myhre is a 79 year old female with a medical history of new onset atrial fibrillation.    The patient states that she has been doing well since last visit 2 months ago.  She has occasional exertional shortness of breath.  She denies chest pain, palpitations, dizziness, lightheadedness, syncope, orthopnea, PND or leg edema.  Her blood pressure and heart rate are controlled.    Her Zio patch monitor was reported continues atrial fibrillation with controlled ventricular rate.  Echocardiogram was reported normal left ventricular size, LVEF of 60 to 65%, significant hypokinesis in distal anteroseptal and apical segments, mild aortic valve regurgitation.    Past Medical History:     Patient Active Problem List   Diagnosis     Hemorrhoids     New onset atrial fibrillation (H)       Past Surgical History:   No past surgical history on file.    Family History:   Reviewed: Family history of coronary artery disease, no family history of atrial fibrillation.    Social History:    reports that she has quit smoking. She does not have any smokeless tobacco history on file. She reports that she does not drink alcohol and does not use drugs.    Review of Systems:   12 systems are reviewed negative except for in HPI.    Meds:     Current Outpatient Medications:      apixaban ANTICOAGULANT (ELIQUIS ANTICOAGULANT) 5 MG tablet, Take 1 tablet (5 mg) by mouth 2 times daily., Disp: 60 tablet, Rfl: 11     metoprolol tartrate (LOPRESSOR) 25 MG tablet, Take 1 tablet (25 mg) by mouth 2 times daily., Disp: 60 tablet, Rfl: 11     Allergies:   Patient has no known allergies.    Objective:      Physical Exam  79.4 kg (175 lb)  1.676 m (5' 6\")  Body mass index is 28.25 kg/m .  BP (!) 152/62 (BP Location: Right arm, Patient Position: Sitting, Cuff Size: Adult Regular)   Pulse 64   Resp 16   Ht 1.676 m (5' 6\")   Wt 79.4 kg (175 lb)   BMI 28.25 kg/m      General Appearance:   Awake, Alert, No acute distress. "   HEENT:  Pupil equal, reactive to light. No scleral icterus; the mucous membranes were moist. No oral ulcers or thrush.    Neck: No cervical bruits. No JVD. No thyromegaly. No lymph node enlargement or tenderness.   Chest: The spine was straight. The chest was symmetric.   Lungs:   Respirations unlabored. Lungs are clear to auscultation. No crackles. No wheezing.   Cardiovascular:   IRRR, normal first and second heart sounds with no murmurs. No rubs or gallops.    Abdomen:  Soft. No tenderness. Non-distended. Bowels sounds are present   Extremities: Equal posterior tibial pulses. No leg edema.   Skin: No rashes or ulcers. Warm, Dry.   Musculoskeletal: No tenderness. No deformity.   Neurologic: Mood and affect are appropriate. No focal deficits.         EKG:  Personally reivewed  Atrial fibrillation with rapid ventricular response  Possible anterior infarct, age undetermined  Abnormal ECG  No previous ECG for comparison    Cardiac Imaging Studies  Echo on November 22, 2024:  1.Left ventricular size and function are normal. The ejection  fraction is 60-65%.  2.There is apical akinesis.  3.Normal right ventricle size and systolic function.  4.The left atrium is borderline dilated.  5.No hemodynamically significant valvular abnormalities on 2D or color flow  imaging, mild leaks as below.  There is no comparison study available.    Zio patch monitor on November 8, 2024:  Zio monitoring from 10/25/2024 to 11/1/2024 (duration 7d 12h).  Continuous atrial fibrillation, ventricular rates 41 to 168bpm, average 69bpm.  Intermittent IVCD.  There were no pauses of greater than 3 seconds.  Rare premature ventricular contractions (isolated <1%).  Symptom triggers (2) correlated to atrial fibrillation with ventricular rate 61-114bpm.    Lab Review   Lab Results   Component Value Date     10/16/2024    .0 04/09/2013    CO2 25 10/16/2024    CO2 29.0 04/09/2013    BUN 11.4 10/16/2024    BUN 14.0 04/09/2013     Lab Results    Component Value Date    WBC 10.8 10/16/2024    HGB 12.9 10/16/2024    HCT 40.3 10/16/2024    MCV 92 10/16/2024     10/16/2024     Lab Results   Component Value Date    CHOL 189 05/02/2012    HDL 59 05/02/2012     05/02/2012     LDL Cholesterol Calculated   Date Value Ref Range Status   05/02/2012 112 <130 mg/dL Final     Lab Results   Component Value Date    TSH 3.06 10/16/2024                   Thank you for allowing me to participate in the care of your patient.      Sincerely,     Janie Calderon MD     [facility]  cc:   Janie Calderon MD  [address] [facility] [address]  [place] [id number]

## 2025-01-27 ENCOUNTER — HOSPITAL ENCOUNTER (OUTPATIENT)
Facility: HOSPITAL | Age: 80
End: 2025-01-27
Attending: STUDENT IN AN ORGANIZED HEALTH CARE EDUCATION/TRAINING PROGRAM | Admitting: STUDENT IN AN ORGANIZED HEALTH CARE EDUCATION/TRAINING PROGRAM
Payer: COMMERCIAL

## 2025-01-27 DIAGNOSIS — R06.09 DOE (DYSPNEA ON EXERTION): ICD-10-CM

## 2025-01-27 DIAGNOSIS — R94.39 ABNORMAL CARDIOVASCULAR STRESS TEST: ICD-10-CM

## 2025-01-27 NOTE — Clinical Note
I spoke to patient's nurse related to no COVID test showing up for admission on patient's chart. RN verbally provided me the information on patient's paper chart related to a COVID test she had done at another facility that is not listed in Care Everywhere.     Test completed at [facility]  Negative on 5/3/2021 1915    Patient does not need another test for admission screening. Refer to hardcopy on chart in the unit.          MOMO Peoples  05/05/21 [id number]     Sheath prepped and inserted in the right radial artery.

## 2025-01-28 ENCOUNTER — TELEPHONE (OUTPATIENT)
Dept: CARDIOLOGY | Facility: CLINIC | Age: 80
End: 2025-01-28
Payer: COMMERCIAL

## 2025-01-28 ENCOUNTER — PREP FOR PROCEDURE (OUTPATIENT)
Dept: CARDIOLOGY | Facility: CLINIC | Age: 80
End: 2025-01-28
Payer: COMMERCIAL

## 2025-01-28 DIAGNOSIS — R06.09 DOE (DYSPNEA ON EXERTION): Primary | ICD-10-CM

## 2025-01-28 DIAGNOSIS — R94.39 ABNORMAL CARDIOVASCULAR STRESS TEST: ICD-10-CM

## 2025-01-28 LAB
ABO + RH BLD: NORMAL
BLD GP AB SCN SERPL QL: NEGATIVE
SPECIMEN EXP DATE BLD: NORMAL

## 2025-01-28 RX ORDER — LIDOCAINE 40 MG/G
CREAM TOPICAL
OUTPATIENT
Start: 2025-01-28

## 2025-01-28 RX ORDER — SODIUM CHLORIDE 9 MG/ML
INJECTION, SOLUTION INTRAVENOUS CONTINUOUS
OUTPATIENT
Start: 2025-01-31

## 2025-01-28 RX ORDER — ASPIRIN 81 MG/1
243 TABLET, CHEWABLE ORAL ONCE
OUTPATIENT
Start: 2025-01-31

## 2025-01-28 RX ORDER — ASPIRIN 325 MG
325 TABLET ORAL ONCE
OUTPATIENT
Start: 2025-01-31 | End: 2025-01-28

## 2025-01-28 RX ORDER — FENTANYL CITRATE 50 UG/ML
25 INJECTION, SOLUTION INTRAMUSCULAR; INTRAVENOUS
OUTPATIENT
Start: 2025-01-31

## 2025-01-28 NOTE — PROGRESS NOTES
Assessment/Recommendations   Assessment/Plan:  1.  Abnormal Cardiac MRI stress test and echocardiogram Cardia MRI stress test done on 1/23/2025  Showed some myocardial ischemia mid to distal septal, mid to distal inferoseptal and mid to distal inferior walls medium in size. 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    Discussed about the indication for coronary angiogram/possible PCI and the risks associated with angiogram including <0.1% of MI and CVA or death or any of the following to the degree that it could threaten her life: allergic reaction, arrhythmia, renal failure, hemorrhage, thrombosis and infection.  Risk associated with therapeutic intervention includes 2% or less risk of MI, less than 1% risk of CVA, emergency heart surgery, death, less than 4% risk of vascular injury requiring surgical repair or blood transfusion with 20-30% risk of restenosis with a bare-metal stent and less than 10% risk of stenosis with a drug-eluting stent.     Recent lab work including BMP and CBC were unremarkable.   pre-angio teach via MyChart from Lia Myrick RN             2.  Persistent atrial Fibrillation: Rate controlled on metoprolol tartrate and Eliquis anticoagulation.       Plan:  - Avoid strenuous exercise or lifting heavy objects until further direction  - Please seek medical attention if persistent worsening chest pain/tightness/pressure, shortness of breath, lightheaded or dizziness  - She is holding Eliquis appropriately      Post-angiogram follow up with Dr. Calderon 2/18/2025     History of Present Illness/Subjective    Linda K Myhre is a 79 year old years old  with a significant PMH of atrial fibrillation  is here at [facility] for pre procedure history and physical examination for coronary angiogram.. Cardiac MRI stress test done on 1/23/2025  Showed some myocardial ischemia mid to distal  septal, mid to distal inferoseptal and mid to distal inferior walls medium in size. Dr. Calderon has recommended coronary angiogram with possible coronary intervention.     She denies lightheadedness, shortness of breath, dyspnea on exertion, orthopnea, PND, palpitations, chest pain, abdominal fullness/bloating, and lower extremity edema.  she further denies fever, chills, N/V, diarrhea, vomiting, constipation, hematochezia, hematemesis, hemoptysis. He/she also denies malignant hyperthermia, stroke/TIA, bleeding or clotting disorders, COPD/Asthma/JOSE, anaphylaxis reaction to medications, IV contrast.        Stress test done on 1/23/2025 - Reviewed  Result Text                                                 FINAL IMPRESSION   ==========================================================================================================     1.  Pharmacological Regdenoson stress cardiac MRI is positive for inducible myocardial ischemia in  mid to  distal septal, mid to distal inferoseptal and mid to distal inferior walls in a medium size. .  2.  Pharmacological Regdenoson stress ECG is negative for inducible myocardial ischemia.  The patient is in  atrial fibrillation.  3.  Normal left ventricular size and thin in apex and distal anteroseptal segment. There is akinetic in  apical , distal anteroseptal and distal inferior segments.  There is no LV mass/thrombus.  The estimated  left ventricular ejection fraction is 55-60%.  4.  There is transmural myocardial infarction in apical, distal anteroseptal and distal inferior segments.   The LV scar size is 16 %.  5.  Normal right ventricular size and systolic function.  6.  Moderately enlarged both atria.  7.  Mild mitral and tricuspid valve regurgitation.     TTE 11/22/2025-reviewed:     1.Left ventricular size, wall motion and function are normal. The ejection  fraction is 60-65%.  2.There is apical akinesis.  3.Normal right ventricle size and systolic function.  4.The left atrium is  "borderline dilated.  5.No hemodynamically significant valvular abnormalities on 2D or color flow  imaging, mild leaks as below.  There is no comparison study available.       Physical Examination Review of Systems   /60 (BP Location: Left arm, Patient Position: Sitting, Cuff Size: Adult Regular)   Pulse 64   Resp 16   Ht 1.676 m (5' 6\")   Wt 79.4 kg (175 lb)   BMI 28.25 kg/m    Body mass index is 28.25 kg/m .  Wt Readings from Last 3 Encounters:   01/29/25 79.4 kg (175 lb)   12/17/24 79.4 kg (175 lb)   10/25/24 77.6 kg (171 lb)     General Appearance:   no distress, normal body habitus   ENT/Mouth: membranes moist, no oral lesions or bleeding gums.      EYES:  no scleral icterus, normal conjunctivae   Neck: no carotid bruits or thyromegaly   Chest/Lungs:   lungs are clear to auscultation, no rales or wheezing, equal chest wall expansion    Cardiovascular:   Irregularly irregular. Normal first and second heart sounds with no murmurs, rubs, or gallops; the carotid, radial and posterior tibial pulses are intact, absent edema bilaterally        Extremities   no cyanosis or clubbing.  Radial pulses and Pedal pulses intact and symmetrical.  CMS intact.   Skin: no xanthelasma, warm.    Neurologic: no tremors     Psychiatric: alert and oriented x3, calm                                                        Negative unless noted in HPI     Medical History  Surgical History Family History Social History   No past medical history on file. No past surgical history on file. No family history on file. Social History     Socioeconomic History    Marital status:      Spouse name: Not on file    Number of children: Not on file    Years of education: Not on file    Highest education level: Not on file   Occupational History    Not on file   Tobacco Use    Smoking status: Former    Smokeless tobacco: Not on file   Substance and Sexual Activity    Alcohol use: No    Drug use: No    Sexual activity: Not on file "   Other Topics Concern    Not on file   Social History Narrative    Not on file     Social Drivers of Health     Financial Resource Strain: Not on file   Food Insecurity: Not on file   Transportation Needs: Not on file   Physical Activity: Not on file   Stress: Not on file   Social Connections: Not on file   Interpersonal Safety: Not on file   Housing Stability: Not on file          Medications  Allergies   Current Outpatient Medications   Medication Sig Dispense Refill    apixaban ANTICOAGULANT (ELIQUIS ANTICOAGULANT) 5 MG tablet Take 1 tablet (5 mg) by mouth 2 times daily. 60 tablet 11    metoprolol tartrate (LOPRESSOR) 25 MG tablet Take 1 tablet (25 mg) by mouth 2 times daily. 60 tablet 11    No Known Allergies      Lab Results    Chemistry/lipid CBC Cardiac Enzymes/BNP/TSH/INR   Lab Results   Component Value Date    CHOL 189 05/02/2012    HDL 59 05/02/2012    BUN 11.4 10/16/2024     10/16/2024    CO2 25 10/16/2024    Lab Results   Component Value Date    WBC 10.8 10/16/2024    HGB 12.9 10/16/2024    HCT 40.3 10/16/2024    MCV 92 10/16/2024     10/16/2024    Lab Results   Component Value Date    TSH 3.06 10/16/2024            This note has been dictated using voice recognition software. Any grammatical, typographical, or context distortions are unintentional and inherent to the software    Celestina Fermin PA-C

## 2025-01-28 NOTE — H&P (VIEW-ONLY)
Assessment/Recommendations   Assessment/Plan:  1.  Abnormal Cardiac MRI stress test and echocardiogram Cardia MRI stress test done on 1/23/2025  Showed some myocardial ischemia mid to distal septal, mid to distal inferoseptal and mid to distal inferior walls medium in size. 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    Discussed about the indication for coronary angiogram/possible PCI and the risks associated with angiogram including <0.1% of MI and CVA or death or any of the following to the degree that it could threaten her life: allergic reaction, arrhythmia, renal failure, hemorrhage, thrombosis and infection.  Risk associated with therapeutic intervention includes 2% or less risk of MI, less than 1% risk of CVA, emergency heart surgery, death, less than 4% risk of vascular injury requiring surgical repair or blood transfusion with 20-30% risk of restenosis with a bare-metal stent and less than 10% risk of stenosis with a drug-eluting stent.     Recent lab work including BMP and CBC were unremarkable.   pre-angio teach via MyChart from Lia Myrick RN             2.  Persistent atrial Fibrillation: Rate controlled on metoprolol tartrate and Eliquis anticoagulation.       Plan:  - Avoid strenuous exercise or lifting heavy objects until further direction  - Please seek medical attention if persistent worsening chest pain/tightness/pressure, shortness of breath, lightheaded or dizziness  - She is holding Eliquis appropriately      Post-angiogram follow up with Dr. Calderon 2/18/2025     History of Present Illness/Subjective    Linda K Myhre is a 79 year old years old  with a significant PMH of atrial fibrillation  is here at [facility] for pre procedure history and physical examination for coronary angiogram.. Cardiac MRI stress test done on 1/23/2025  Showed some myocardial ischemia mid to distal  septal, mid to distal inferoseptal and mid to distal inferior walls medium in size. Dr. Calderon has recommended coronary angiogram with possible coronary intervention.     She denies lightheadedness, shortness of breath, dyspnea on exertion, orthopnea, PND, palpitations, chest pain, abdominal fullness/bloating, and lower extremity edema.  she further denies fever, chills, N/V, diarrhea, vomiting, constipation, hematochezia, hematemesis, hemoptysis. He/she also denies malignant hyperthermia, stroke/TIA, bleeding or clotting disorders, COPD/Asthma/JOSE, anaphylaxis reaction to medications, IV contrast.        Stress test done on 1/23/2025 - Reviewed  Result Text                                                 FINAL IMPRESSION   ==========================================================================================================     1.  Pharmacological Regdenoson stress cardiac MRI is positive for inducible myocardial ischemia in  mid to  distal septal, mid to distal inferoseptal and mid to distal inferior walls in a medium size. .  2.  Pharmacological Regdenoson stress ECG is negative for inducible myocardial ischemia.  The patient is in  atrial fibrillation.  3.  Normal left ventricular size and thin in apex and distal anteroseptal segment. There is akinetic in  apical , distal anteroseptal and distal inferior segments.  There is no LV mass/thrombus.  The estimated  left ventricular ejection fraction is 55-60%.  4.  There is transmural myocardial infarction in apical, distal anteroseptal and distal inferior segments.   The LV scar size is 16 %.  5.  Normal right ventricular size and systolic function.  6.  Moderately enlarged both atria.  7.  Mild mitral and tricuspid valve regurgitation.     TTE 11/22/2025-reviewed:     1.Left ventricular size, wall motion and function are normal. The ejection  fraction is 60-65%.  2.There is apical akinesis.  3.Normal right ventricle size and systolic function.  4.The left atrium is  "borderline dilated.  5.No hemodynamically significant valvular abnormalities on 2D or color flow  imaging, mild leaks as below.  There is no comparison study available.       Physical Examination Review of Systems   /60 (BP Location: Left arm, Patient Position: Sitting, Cuff Size: Adult Regular)   Pulse 64   Resp 16   Ht 1.676 m (5' 6\")   Wt 79.4 kg (175 lb)   BMI 28.25 kg/m    Body mass index is 28.25 kg/m .  Wt Readings from Last 3 Encounters:   01/29/25 79.4 kg (175 lb)   12/17/24 79.4 kg (175 lb)   10/25/24 77.6 kg (171 lb)     General Appearance:   no distress, normal body habitus   ENT/Mouth: membranes moist, no oral lesions or bleeding gums.      EYES:  no scleral icterus, normal conjunctivae   Neck: no carotid bruits or thyromegaly   Chest/Lungs:   lungs are clear to auscultation, no rales or wheezing, equal chest wall expansion    Cardiovascular:   Irregularly irregular. Normal first and second heart sounds with no murmurs, rubs, or gallops; the carotid, radial and posterior tibial pulses are intact, absent edema bilaterally        Extremities   no cyanosis or clubbing.  Radial pulses and Pedal pulses intact and symmetrical.  CMS intact.   Skin: no xanthelasma, warm.    Neurologic: no tremors     Psychiatric: alert and oriented x3, calm                                                        Negative unless noted in HPI     Medical History  Surgical History Family History Social History   No past medical history on file. No past surgical history on file. No family history on file. Social History     Socioeconomic History    Marital status:      Spouse name: Not on file    Number of children: Not on file    Years of education: Not on file    Highest education level: Not on file   Occupational History    Not on file   Tobacco Use    Smoking status: Former    Smokeless tobacco: Not on file   Substance and Sexual Activity    Alcohol use: No    Drug use: No    Sexual activity: Not on file "   Other Topics Concern    Not on file   Social History Narrative    Not on file     Social Drivers of Health     Financial Resource Strain: Not on file   Food Insecurity: Not on file   Transportation Needs: Not on file   Physical Activity: Not on file   Stress: Not on file   Social Connections: Not on file   Interpersonal Safety: Not on file   Housing Stability: Not on file          Medications  Allergies   Current Outpatient Medications   Medication Sig Dispense Refill    apixaban ANTICOAGULANT (ELIQUIS ANTICOAGULANT) 5 MG tablet Take 1 tablet (5 mg) by mouth 2 times daily. 60 tablet 11    metoprolol tartrate (LOPRESSOR) 25 MG tablet Take 1 tablet (25 mg) by mouth 2 times daily. 60 tablet 11    No Known Allergies      Lab Results    Chemistry/lipid CBC Cardiac Enzymes/BNP/TSH/INR   Lab Results   Component Value Date    CHOL 189 05/02/2012    HDL 59 05/02/2012    BUN 11.4 10/16/2024     10/16/2024    CO2 25 10/16/2024    Lab Results   Component Value Date    WBC 10.8 10/16/2024    HGB 12.9 10/16/2024    HCT 40.3 10/16/2024    MCV 92 10/16/2024     10/16/2024    Lab Results   Component Value Date    TSH 3.06 10/16/2024            This note has been dictated using voice recognition software. Any grammatical, typographical, or context distortions are unintentional and inherent to the software    Celestina Fermin PA-C

## 2025-01-28 NOTE — TELEPHONE ENCOUNTER
Linda K Myhre  1975 [facility] [address]  [phone] (home)     Procedure cardiologist:   or       PCP:  No Ref-Primary, Physician  H&P completed by: Celestina Fermin 1/29  Admit date 1/31  Arrival time:  06:30am  Anticoagulation: Eliquis  CPAP: No  Previous PCI: NO   Bypass Grafts: No  Renal Issues: No  Diabetic?: No  Device?: No  Type:  NA  Ambulation status: Independent    Patient Education/  Angiogram Teaching    Reason for Visit:  Telephone call to discuss pre-procedure education in preparation for: CA possible PCI    Procedure Prep:  Primary Cardiologist note dated:  12/17/24  EKG results obtained, dated: On Admit  Hemogram results obtained: 1/29  Basic Metabolic Panel results obtained: 1/29  Lipid Profile results obtained: 1/29  Pertinent cardiac test results: Abnormal Cardiac Stress MRI, exertional SOB        Pre-procedure instructions  Patient instructed to be NPO per anesthesia guidelines.  Patient instructed to shower the evening before or the morning of the procedure.  Patient instructed to arrange for transportation home following procedure from a responsible family member of friend. No driving for at least 24 hours.  Patient instructed to have a responsible adult with them for 24 hours post-procedure.  Post-procedure follow up process.  Conscious sedation discussed.    Pre-procedure medication instructions  Patient instructed on antiplatelet medication.  Continue medications as scheduled, with a small amount of water on the day of the procedure unless indicated.  Patient instructed to take (4) 81 mg of Aspirin am of procedure: No  Other medication: instructed to HOLD Eliquis 1/29, 1/30 and a.m. of the procedure.      Patient states understanding of procedure and agrees to proceed.    *PATIENTS RECORDS AVAILABLE IN [employer] UNLESS OTHERWISE INDICATED*      Patient Active Problem List   Diagnosis    Hemorrhoids    New onset atrial fibrillation (H)       Current  Outpatient Medications   Medication Sig Dispense Refill    apixaban ANTICOAGULANT (ELIQUIS ANTICOAGULANT) 5 MG tablet Take 1 tablet (5 mg) by mouth 2 times daily. 60 tablet 11    metoprolol tartrate (LOPRESSOR) 25 MG tablet Take 1 tablet (25 mg) by mouth 2 times daily. 60 tablet 11       No Known Allergies      Lia Zheng, RN, BSN

## 2025-01-28 NOTE — TELEPHONE ENCOUNTER
----- Message from Jade Morris sent at 1/27/2025  9:36 AM CST -----  Regarding: RE: CA possible PCI  Case type: CA POSS PCI    Procedure Physician(s): SARANYA    Procedure Date and Patient Arrival Time: Friday 1/31, with arrival time of 0630AM    H&P: Pt scheduled on 1/29 WITH RAGINI SALAS    Pre-Procedure Lab Appt: Scheduled 1/29 AT [facility] - Please place lab orders if you haven't already!    Alerts/Important Info: on [employer]- needs 48 hour hold prior to procedure    Interpretor Requested: NA      Thank you,  Jade  ----- Message -----  From: Lia Zheng RN  Sent: 1/24/2025   4:32 PM CST  To: [facility] Procedure  Pool - adan  Subject: CA possible PCI                                  Case Type: CA possible PCI  Ordering Provider:   H&P: could you help her to schedule here at heart clinic if possible pt reports not having PCP  Alerts: on [employer]- needs 48 hour hold prior to procedure  Additional Info/Urgency: None  Orders for Pre-Procedure Labs? Labs to be scheduled 2-3 days prior to appt- will place orders.

## 2025-01-29 ENCOUNTER — OFFICE VISIT (OUTPATIENT)
Dept: CARDIOLOGY | Facility: CLINIC | Age: 80
End: 2025-01-29
Payer: COMMERCIAL

## 2025-01-29 ENCOUNTER — APPOINTMENT (OUTPATIENT)
Dept: CARDIOLOGY | Facility: CLINIC | Age: 80
End: 2025-01-29
Payer: COMMERCIAL

## 2025-01-29 VITALS
BODY MASS INDEX: 28.12 KG/M2 | DIASTOLIC BLOOD PRESSURE: 60 MMHG | RESPIRATION RATE: 16 BRPM | WEIGHT: 175 LBS | HEIGHT: 66 IN | HEART RATE: 64 BPM | SYSTOLIC BLOOD PRESSURE: 138 MMHG

## 2025-01-29 DIAGNOSIS — I48.19 PERSISTENT ATRIAL FIBRILLATION (H): ICD-10-CM

## 2025-01-29 DIAGNOSIS — R94.39 ABNORMAL CARDIOVASCULAR STRESS TEST: Primary | ICD-10-CM

## 2025-01-29 DIAGNOSIS — R06.09 DOE (DYSPNEA ON EXERTION): ICD-10-CM

## 2025-01-29 LAB
ANION GAP SERPL CALCULATED.3IONS-SCNC: 9 MMOL/L (ref 7–15)
BUN SERPL-MCNC: 16 MG/DL (ref 8–23)
CALCIUM SERPL-MCNC: 9.7 MG/DL (ref 8.8–10.4)
CHLORIDE SERPL-SCNC: 105 MMOL/L (ref 98–107)
CREAT SERPL-MCNC: 0.83 MG/DL (ref 0.51–0.95)
EGFRCR SERPLBLD CKD-EPI 2021: 72 ML/MIN/1.73M2
ERYTHROCYTE [DISTWIDTH] IN BLOOD BY AUTOMATED COUNT: 13.6 % (ref 10–15)
GLUCOSE SERPL-MCNC: 85 MG/DL (ref 70–99)
HCO3 SERPL-SCNC: 28 MMOL/L (ref 22–29)
HCT VFR BLD AUTO: 43.4 % (ref 35–47)
HGB BLD-MCNC: 13.8 G/DL (ref 11.7–15.7)
MCH RBC QN AUTO: 29.8 PG (ref 26.5–33)
MCHC RBC AUTO-ENTMCNC: 31.8 G/DL (ref 31.5–36.5)
MCV RBC AUTO: 94 FL (ref 78–100)
PLATELET # BLD AUTO: 231 10E3/UL (ref 150–450)
POTASSIUM SERPL-SCNC: 4.7 MMOL/L (ref 3.4–5.3)
RBC # BLD AUTO: 4.63 10E6/UL (ref 3.8–5.2)
SODIUM SERPL-SCNC: 142 MMOL/L (ref 135–145)
WBC # BLD AUTO: 6.4 10E3/UL (ref 4–11)

## 2025-01-29 PROCEDURE — 86850 RBC ANTIBODY SCREEN: CPT

## 2025-01-29 PROCEDURE — 80048 BASIC METABOLIC PNL TOTAL CA: CPT

## 2025-01-29 PROCEDURE — 85027 COMPLETE CBC AUTOMATED: CPT

## 2025-01-29 PROCEDURE — 86900 BLOOD TYPING SEROLOGIC ABO: CPT

## 2025-01-29 PROCEDURE — 36415 COLL VENOUS BLD VENIPUNCTURE: CPT

## 2025-01-29 PROCEDURE — 86901 BLOOD TYPING SEROLOGIC RH(D): CPT

## 2025-01-29 NOTE — LETTER
1/29/2025    Physician No Ref-Primary  No address on file    RE: Linda K Myhre       Dear Colleague,     I had the pleasure of seeing Linda K Myhre in the [facility].          Assessment/Recommendations   Assessment/Plan:  1.  Abnormal Cardiac MRI stress test and echocardiogram Cardia MRI stress test done on 1/23/2025  Showed some myocardial ischemia mid to distal septal, mid to distal inferoseptal and mid to distal inferior walls medium in size. 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    Discussed about the indication for coronary angiogram/possible PCI and the risks associated with angiogram including <0.1% of MI and CVA or death or any of the following to the degree that it could threaten her life: allergic reaction, arrhythmia, renal failure, hemorrhage, thrombosis and infection.  Risk associated with therapeutic intervention includes 2% or less risk of MI, less than 1% risk of CVA, emergency heart surgery, death, less than 4% risk of vascular injury requiring surgical repair or blood transfusion with 20-30% risk of restenosis with a bare-metal stent and less than 10% risk of stenosis with a drug-eluting stent.     Recent lab work including BMP and CBC were unremarkable.   pre-angio teach via MyChart from Lia Myrick RN             2.  Persistent atrial Fibrillation: Rate controlled on metoprolol tartrate and Eliquis anticoagulation.       Plan:  - Avoid strenuous exercise or lifting heavy objects until further direction  - Please seek medical attention if persistent worsening chest pain/tightness/pressure, shortness of breath, lightheaded or dizziness  - She is holding Eliquis appropriately      Post-angiogram follow up with Dr. Calderon 2/18/2025     History of Present Illness/Subjective    Linda K Myhre is a 79 year old years old  with a significant PMH of atrial fibrillation  is here at [facility] for pre procedure history and physical examination for coronary angiogram.. Cardiac MRI stress test done on 1/23/2025  Showed some myocardial ischemia mid to distal septal, mid to distal inferoseptal and mid to distal inferior walls medium in size. Dr. Calderon has recommended coronary angiogram with possible coronary intervention.     She denies lightheadedness, shortness of breath, dyspnea on exertion, orthopnea, PND, palpitations, chest pain, abdominal fullness/bloating, and lower extremity edema.  she further denies fever, chills, N/V, diarrhea, vomiting, constipation, hematochezia, hematemesis, hemoptysis. He/she also denies malignant hyperthermia, stroke/TIA, bleeding or clotting disorders, COPD/Asthma/JOSE, anaphylaxis reaction to medications, IV contrast.        Stress test done on 1/23/2025 - Reviewed  Result Text                                                 FINAL IMPRESSION   ==========================================================================================================     1.  Pharmacological Regdenoson stress cardiac MRI is positive for inducible myocardial ischemia in  mid to  distal septal, mid to distal inferoseptal and mid to distal inferior walls in a medium size. .  2.  Pharmacological Regdenoson stress ECG is negative for inducible myocardial ischemia.  The patient is in  atrial fibrillation.  3.  Normal left ventricular size and thin in apex and distal anteroseptal segment. There is akinetic in  apical , distal anteroseptal and distal inferior segments.  There is no LV mass/thrombus.  The estimated  left ventricular ejection fraction is 55-60%.  4.  There is transmural myocardial infarction in apical, distal anteroseptal and distal inferior segments.   The LV scar size is 16 %.  5.  Normal right ventricular size and systolic function.  6.  Moderately enlarged both atria.  7.  Mild mitral and tricuspid valve regurgitation.     TTE 11/22/2025-reviewed:     1.Left  "ventricular size, wall motion and function are normal. The ejection  fraction is 60-65%.  2.There is apical akinesis.  3.Normal right ventricle size and systolic function.  4.The left atrium is borderline dilated.  5.No hemodynamically significant valvular abnormalities on 2D or color flow  imaging, mild leaks as below.  There is no comparison study available.       Physical Examination Review of Systems   /60 (BP Location: Left arm, Patient Position: Sitting, Cuff Size: Adult Regular)   Pulse 64   Resp 16   Ht 1.676 m (5' 6\")   Wt 79.4 kg (175 lb)   BMI 28.25 kg/m    Body mass index is 28.25 kg/m .  Wt Readings from Last 3 Encounters:   01/29/25 79.4 kg (175 lb)   12/17/24 79.4 kg (175 lb)   10/25/24 77.6 kg (171 lb)     General Appearance:   no distress, normal body habitus   ENT/Mouth: membranes moist, no oral lesions or bleeding gums.      EYES:  no scleral icterus, normal conjunctivae   Neck: no carotid bruits or thyromegaly   Chest/Lungs:   lungs are clear to auscultation, no rales or wheezing, equal chest wall expansion    Cardiovascular:   Irregularly irregular. Normal first and second heart sounds with no murmurs, rubs, or gallops; the carotid, radial and posterior tibial pulses are intact, absent edema bilaterally        Extremities   no cyanosis or clubbing.  Radial pulses and Pedal pulses intact and symmetrical.  CMS intact.   Skin: no xanthelasma, warm.    Neurologic: no tremors     Psychiatric: alert and oriented x3, calm                                                        Negative unless noted in HPI     Medical History  Surgical History Family History Social History   No past medical history on file. No past surgical history on file. No family history on file. Social History     Socioeconomic History     Marital status:      Spouse name: Not on file     Number of children: Not on file     Years of education: Not on file     Highest education level: Not on file   Occupational " History     Not on file   Tobacco Use     Smoking status: Former     Smokeless tobacco: Not on file   Substance and Sexual Activity     Alcohol use: No     Drug use: No     Sexual activity: Not on file   Other Topics Concern     Not on file   Social History Narrative     Not on file     Social Drivers of Health     Financial Resource Strain: Not on file   Food Insecurity: Not on file   Transportation Needs: Not on file   Physical Activity: Not on file   Stress: Not on file   Social Connections: Not on file   Interpersonal Safety: Not on file   Housing Stability: Not on file          Medications  Allergies   Current Outpatient Medications   Medication Sig Dispense Refill     apixaban ANTICOAGULANT (ELIQUIS ANTICOAGULANT) 5 MG tablet Take 1 tablet (5 mg) by mouth 2 times daily. 60 tablet 11     metoprolol tartrate (LOPRESSOR) 25 MG tablet Take 1 tablet (25 mg) by mouth 2 times daily. 60 tablet 11    No Known Allergies      Lab Results    Chemistry/lipid CBC Cardiac Enzymes/BNP/TSH/INR   Lab Results   Component Value Date    CHOL 189 05/02/2012    HDL 59 05/02/2012    BUN 11.4 10/16/2024     10/16/2024    CO2 25 10/16/2024    Lab Results   Component Value Date    WBC 10.8 10/16/2024    HGB 12.9 10/16/2024    HCT 40.3 10/16/2024    MCV 92 10/16/2024     10/16/2024    Lab Results   Component Value Date    TSH 3.06 10/16/2024            This note has been dictated using voice recognition software. Any grammatical, typographical, or context distortions are unintentional and inherent to the software    Celestina Fermin PA-C                                   Thank you for allowing me to participate in the care of your patient.      Sincerely,     Celestina Anderson PA-C     [facility]  cc:   Janie Calderon MD  [address]  [place] [address]

## 2025-01-31 VITALS
TEMPERATURE: 98.1 F | DIASTOLIC BLOOD PRESSURE: 62 MMHG | HEIGHT: 66 IN | WEIGHT: 175 LBS | OXYGEN SATURATION: 96 % | RESPIRATION RATE: 28 BRPM | HEART RATE: 59 BPM | SYSTOLIC BLOOD PRESSURE: 135 MMHG | BODY MASS INDEX: 28.12 KG/M2

## 2025-01-31 PROBLEM — R94.39 ABNORMAL CARDIOVASCULAR STRESS TEST: Status: ACTIVE | Noted: 2025-01-31

## 2025-01-31 PROBLEM — Z98.890 STATUS POST CORONARY ANGIOGRAM: Status: ACTIVE | Noted: 2025-01-31

## 2025-01-31 PROBLEM — R06.09 DOE (DYSPNEA ON EXERTION): Status: ACTIVE | Noted: 2025-01-31

## 2025-01-31 LAB
ATRIAL RATE - MUSE: NORMAL BPM
DIASTOLIC BLOOD PRESSURE - MUSE: NORMAL MMHG
INTERPRETATION ECG - MUSE: NORMAL
P AXIS - MUSE: NORMAL DEGREES
PR INTERVAL - MUSE: NORMAL MS
QRS DURATION - MUSE: 96 MS
QT - MUSE: 430 MS
QTC - MUSE: 425 MS
R AXIS - MUSE: -22 DEGREES
SYSTOLIC BLOOD PRESSURE - MUSE: NORMAL MMHG
T AXIS - MUSE: 17 DEGREES
VENTRICULAR RATE- MUSE: 59 BPM

## 2025-01-31 PROCEDURE — 93005 ELECTROCARDIOGRAM TRACING: CPT

## 2025-01-31 PROCEDURE — 93571 IV DOP VEL&/PRESS C FLO 1ST: CPT | Mod: 26 | Performed by: STUDENT IN AN ORGANIZED HEALTH CARE EDUCATION/TRAINING PROGRAM

## 2025-01-31 PROCEDURE — 250N000013 HC RX MED GY IP 250 OP 250 PS 637: Performed by: NURSE PRACTITIONER

## 2025-01-31 PROCEDURE — 99152 MOD SED SAME PHYS/QHP 5/>YRS: CPT | Performed by: STUDENT IN AN ORGANIZED HEALTH CARE EDUCATION/TRAINING PROGRAM

## 2025-01-31 PROCEDURE — 250N000009 HC RX 250: Performed by: STUDENT IN AN ORGANIZED HEALTH CARE EDUCATION/TRAINING PROGRAM

## 2025-01-31 PROCEDURE — 272N000001 HC OR GENERAL SUPPLY STERILE: Performed by: STUDENT IN AN ORGANIZED HEALTH CARE EDUCATION/TRAINING PROGRAM

## 2025-01-31 PROCEDURE — 93458 L HRT ARTERY/VENTRICLE ANGIO: CPT | Performed by: STUDENT IN AN ORGANIZED HEALTH CARE EDUCATION/TRAINING PROGRAM

## 2025-01-31 PROCEDURE — C1769 GUIDE WIRE: HCPCS | Performed by: STUDENT IN AN ORGANIZED HEALTH CARE EDUCATION/TRAINING PROGRAM

## 2025-01-31 PROCEDURE — C1887 CATHETER, GUIDING: HCPCS | Performed by: STUDENT IN AN ORGANIZED HEALTH CARE EDUCATION/TRAINING PROGRAM

## 2025-01-31 PROCEDURE — 93799 UNLISTED CV SVC/PROCEDURE: CPT | Performed by: STUDENT IN AN ORGANIZED HEALTH CARE EDUCATION/TRAINING PROGRAM

## 2025-01-31 PROCEDURE — 999N000054 HC STATISTIC EKG NON-CHARGEABLE

## 2025-01-31 PROCEDURE — 250N000011 HC RX IP 250 OP 636: Performed by: STUDENT IN AN ORGANIZED HEALTH CARE EDUCATION/TRAINING PROGRAM

## 2025-01-31 PROCEDURE — C1894 INTRO/SHEATH, NON-LASER: HCPCS | Performed by: STUDENT IN AN ORGANIZED HEALTH CARE EDUCATION/TRAINING PROGRAM

## 2025-01-31 PROCEDURE — 93010 ELECTROCARDIOGRAM REPORT: CPT | Mod: 59 | Performed by: STUDENT IN AN ORGANIZED HEALTH CARE EDUCATION/TRAINING PROGRAM

## 2025-01-31 PROCEDURE — 255N000002 HC RX 255 OP 636: Performed by: STUDENT IN AN ORGANIZED HEALTH CARE EDUCATION/TRAINING PROGRAM

## 2025-01-31 PROCEDURE — 93458 L HRT ARTERY/VENTRICLE ANGIO: CPT | Mod: 26 | Performed by: STUDENT IN AN ORGANIZED HEALTH CARE EDUCATION/TRAINING PROGRAM

## 2025-01-31 PROCEDURE — 258N000003 HC RX IP 258 OP 636: Performed by: INTERNAL MEDICINE

## 2025-01-31 RX ORDER — LIDOCAINE 40 MG/G
CREAM TOPICAL
Status: DISCONTINUED | OUTPATIENT
Start: 2025-01-31 | End: 2025-01-31 | Stop reason: HOSPADM

## 2025-01-31 RX ORDER — HEPARIN SODIUM 1000 [USP'U]/ML
INJECTION, SOLUTION INTRAVENOUS; SUBCUTANEOUS
Status: DISCONTINUED | OUTPATIENT
Start: 2025-01-31 | End: 2025-01-31 | Stop reason: HOSPADM

## 2025-01-31 RX ORDER — NALOXONE HYDROCHLORIDE 0.4 MG/ML
0.2 INJECTION, SOLUTION INTRAMUSCULAR; INTRAVENOUS; SUBCUTANEOUS
Status: DISCONTINUED | OUTPATIENT
Start: 2025-01-31 | End: 2025-01-31 | Stop reason: HOSPADM

## 2025-01-31 RX ORDER — ASPIRIN 325 MG
325 TABLET ORAL ONCE
Status: COMPLETED | OUTPATIENT
Start: 2025-01-31 | End: 2025-01-31

## 2025-01-31 RX ORDER — ACETAMINOPHEN 325 MG/1
650 TABLET ORAL EVERY 4 HOURS PRN
Status: DISCONTINUED | OUTPATIENT
Start: 2025-01-31 | End: 2025-01-31 | Stop reason: HOSPADM

## 2025-01-31 RX ORDER — OXYCODONE HYDROCHLORIDE 5 MG/1
5 TABLET ORAL EVERY 4 HOURS PRN
Status: DISCONTINUED | OUTPATIENT
Start: 2025-01-31 | End: 2025-01-31 | Stop reason: HOSPADM

## 2025-01-31 RX ORDER — HEPARIN SODIUM 200 [USP'U]/100ML
INJECTION, SOLUTION INTRAVENOUS
Status: DISCONTINUED | OUTPATIENT
Start: 2025-01-31 | End: 2025-01-31 | Stop reason: HOSPADM

## 2025-01-31 RX ORDER — NALOXONE HYDROCHLORIDE 0.4 MG/ML
0.4 INJECTION, SOLUTION INTRAMUSCULAR; INTRAVENOUS; SUBCUTANEOUS
Status: DISCONTINUED | OUTPATIENT
Start: 2025-01-31 | End: 2025-01-31 | Stop reason: HOSPADM

## 2025-01-31 RX ORDER — SODIUM CHLORIDE 9 MG/ML
INJECTION, SOLUTION INTRAVENOUS CONTINUOUS
Status: DISCONTINUED | OUTPATIENT
Start: 2025-01-31 | End: 2025-01-31 | Stop reason: HOSPADM

## 2025-01-31 RX ORDER — NITROGLYCERIN 5 MG/ML
VIAL (ML) INTRAVENOUS
Status: DISCONTINUED | OUTPATIENT
Start: 2025-01-31 | End: 2025-01-31 | Stop reason: HOSPADM

## 2025-01-31 RX ORDER — ATROPINE SULFATE 0.1 MG/ML
0.5 INJECTION INTRAVENOUS
Status: DISCONTINUED | OUTPATIENT
Start: 2025-01-31 | End: 2025-01-31 | Stop reason: HOSPADM

## 2025-01-31 RX ORDER — OXYCODONE HYDROCHLORIDE 5 MG/1
10 TABLET ORAL EVERY 4 HOURS PRN
Status: DISCONTINUED | OUTPATIENT
Start: 2025-01-31 | End: 2025-01-31 | Stop reason: HOSPADM

## 2025-01-31 RX ORDER — DIAZEPAM 5 MG/1
5 TABLET ORAL ONCE
Status: COMPLETED | OUTPATIENT
Start: 2025-01-31 | End: 2025-01-31

## 2025-01-31 RX ORDER — IODIXANOL 320 MG/ML
INJECTION, SOLUTION INTRAVASCULAR
Status: DISCONTINUED | OUTPATIENT
Start: 2025-01-31 | End: 2025-01-31 | Stop reason: HOSPADM

## 2025-01-31 RX ORDER — FENTANYL CITRATE 50 UG/ML
25 INJECTION, SOLUTION INTRAMUSCULAR; INTRAVENOUS
Status: DISCONTINUED | OUTPATIENT
Start: 2025-01-31 | End: 2025-01-31 | Stop reason: HOSPADM

## 2025-01-31 RX ORDER — FENTANYL CITRATE 50 UG/ML
INJECTION, SOLUTION INTRAMUSCULAR; INTRAVENOUS
Status: DISCONTINUED | OUTPATIENT
Start: 2025-01-31 | End: 2025-01-31 | Stop reason: HOSPADM

## 2025-01-31 RX ORDER — ASPIRIN 81 MG/1
243 TABLET, CHEWABLE ORAL ONCE
Status: COMPLETED | OUTPATIENT
Start: 2025-01-31 | End: 2025-01-31

## 2025-01-31 RX ORDER — FLUMAZENIL 0.1 MG/ML
0.2 INJECTION, SOLUTION INTRAVENOUS
Status: DISCONTINUED | OUTPATIENT
Start: 2025-01-31 | End: 2025-01-31 | Stop reason: HOSPADM

## 2025-01-31 RX ADMIN — DIAZEPAM 5 MG: 5 TABLET ORAL at 09:37

## 2025-01-31 RX ADMIN — SODIUM CHLORIDE: 9 INJECTION, SOLUTION INTRAVENOUS at 09:37

## 2025-01-31 ASSESSMENT — ACTIVITIES OF DAILY LIVING (ADL)
ADLS_ACUITY_SCORE: 41

## 2025-01-31 NOTE — PROGRESS NOTES
Pt discharged to home with radial angiogram instructions. No concerns noted. Follow up scheduled with Dr Calderon.

## 2025-01-31 NOTE — DISCHARGE INSTRUCTIONS

## 2025-01-31 NOTE — PRE-PROCEDURE
GENERAL PRE-PROCEDURE:   Procedure:  Coronary angiogram with possible PCI  Date/Time:  1/31/2025 7:01 AM    Written consent obtained?: Yes    Risks and benefits: Risks, benefits and alternatives were discussed    Consent given by:  Patient  Patient states understanding of procedure being performed: Yes    Patient's understanding of procedure matches consent: Yes    Procedure consent matches procedure scheduled: Yes    Expected level of sedation:  Moderate  Appropriately NPO:  Yes  ASA Class:  3 (abnormal cardiac stress MRI, atrial fibrillation; on NOAC)  Mallampati  :  Grade 3- soft palate visible, posterior pharyngeal wall not visible  Lungs:  Lungs clear with good breath sounds bilaterally  Heart:  Normal heart sounds and rate  History & Physical reviewed:  History and physical reviewed and updates made (see comment)  H&P Comments:  Clinically Significant Risk Factors Present on Admission    Cardiovascular : abnormal cardiac stress MRI, atrial fibrillation; on NOAC    Fluid & Electrolyte Disorders : Not present on admission    Gastroenterology : Not present on admission    Hematology/Oncology : Not present on admission    Nephrology : Not present on admission    Neurology : Not present on admission    Pulmonology : Not present on admission    Systemic : Not present on admission    Statement of review:  I have reviewed the lab findings, diagnostic data, medications, and the plan for sedation

## 2025-01-31 NOTE — INTERVAL H&P NOTE
"I have reviewed the surgical (or preoperative) H&P that is linked to this encounter, and examined the patient. There are no significant changes    Clinical Conditions Present on Arrival:  Clinically Significant Risk Factors Present on Admission                 # Drug Induced Coagulation Defect: home medication list includes an anticoagulant medication       # Overweight: Estimated body mass index is 28.25 kg/m  as calculated from the following:    Height as of this encounter: 1.676 m (5' 6\").    Weight as of this encounter: 79.4 kg (175 lb).       "

## 2025-02-07 PROBLEM — Z76.89 ENCOUNTER TO ESTABLISH CARE: Status: ACTIVE | Noted: 2025-02-07

## 2025-02-07 PROBLEM — Z98.890 STATUS POST CORONARY ANGIOGRAM: Status: RESOLVED | Noted: 2025-01-31 | Resolved: 2025-02-07

## 2025-02-07 PROBLEM — I10 PRIMARY HYPERTENSION: Status: ACTIVE | Noted: 2025-02-07

## 2025-02-07 PROBLEM — R06.09 DOE (DYSPNEA ON EXERTION): Status: RESOLVED | Noted: 2025-01-31 | Resolved: 2025-02-07

## 2025-02-07 PROBLEM — M85.89 OSTEOPENIA OF MULTIPLE SITES: Status: ACTIVE | Noted: 2025-02-07

## 2025-02-07 PROBLEM — I48.19 PERSISTENT ATRIAL FIBRILLATION (H): Status: ACTIVE | Noted: 2024-10-25

## 2025-02-10 ENCOUNTER — PATIENT OUTREACH (OUTPATIENT)
Dept: CARE COORDINATION | Facility: CLINIC | Age: 80
End: 2025-02-10
Payer: COMMERCIAL

## 2025-02-13 ENCOUNTER — ANCILLARY PROCEDURE (OUTPATIENT)
Dept: MAMMOGRAPHY | Facility: CLINIC | Age: 80
End: 2025-02-13
Attending: INTERNAL MEDICINE
Payer: COMMERCIAL

## 2025-02-13 DIAGNOSIS — Z12.31 ENCOUNTER FOR SCREENING MAMMOGRAM FOR MALIGNANT NEOPLASM OF BREAST: ICD-10-CM

## 2025-02-13 PROCEDURE — 77063 BREAST TOMOSYNTHESIS BI: CPT

## 2025-02-18 ENCOUNTER — OFFICE VISIT (OUTPATIENT)
Dept: CARDIOLOGY | Facility: CLINIC | Age: 80
End: 2025-02-18
Attending: INTERNAL MEDICINE
Payer: COMMERCIAL

## 2025-02-18 VITALS
HEART RATE: 65 BPM | RESPIRATION RATE: 14 BRPM | SYSTOLIC BLOOD PRESSURE: 130 MMHG | BODY MASS INDEX: 28.73 KG/M2 | DIASTOLIC BLOOD PRESSURE: 72 MMHG | WEIGHT: 178 LBS

## 2025-02-18 DIAGNOSIS — I10 PRIMARY HYPERTENSION: ICD-10-CM

## 2025-02-18 DIAGNOSIS — I48.91 NEW ONSET ATRIAL FIBRILLATION (H): Primary | ICD-10-CM

## 2025-02-18 DIAGNOSIS — I25.10 CORONARY ARTERY DISEASE INVOLVING NATIVE CORONARY ARTERY OF NATIVE HEART WITHOUT ANGINA PECTORIS: ICD-10-CM

## 2025-02-18 DIAGNOSIS — E78.5 DYSLIPIDEMIA, GOAL LDL BELOW 70: ICD-10-CM

## 2025-02-18 DIAGNOSIS — I25.5 ISCHEMIC CARDIOMYOPATHY: ICD-10-CM

## 2025-02-18 LAB
ALT SERPL W P-5'-P-CCNC: 31 U/L (ref 0–50)
CHOLEST SERPL-MCNC: 153 MG/DL
FASTING STATUS PATIENT QL REPORTED: NO
HDLC SERPL-MCNC: 58 MG/DL
LDLC SERPL CALC-MCNC: 81 MG/DL
NONHDLC SERPL-MCNC: 95 MG/DL
TRIGL SERPL-MCNC: 69 MG/DL

## 2025-02-18 PROCEDURE — 99214 OFFICE O/P EST MOD 30 MIN: CPT | Performed by: INTERNAL MEDICINE

## 2025-02-18 PROCEDURE — 84460 ALANINE AMINO (ALT) (SGPT): CPT | Performed by: INTERNAL MEDICINE

## 2025-02-18 PROCEDURE — 36415 COLL VENOUS BLD VENIPUNCTURE: CPT | Performed by: INTERNAL MEDICINE

## 2025-02-18 PROCEDURE — G2211 COMPLEX E/M VISIT ADD ON: HCPCS | Performed by: INTERNAL MEDICINE

## 2025-02-18 PROCEDURE — 80061 LIPID PANEL: CPT | Performed by: INTERNAL MEDICINE

## 2025-02-18 RX ORDER — ATORVASTATIN CALCIUM 20 MG/1
20 TABLET, FILM COATED ORAL DAILY
Qty: 90 TABLET | Refills: 3 | Status: SHIPPED | OUTPATIENT
Start: 2025-02-18

## 2025-02-18 NOTE — LETTER
2/18/2025    Chanel Causey MD  [address] [facility] [address]    RE: Linda K Myhre       Dear Colleague,     I had the pleasure of seeing Linda K Myhre in the [facility].      Click to link to [facility] HEART CARE NOTE     Assessment/Plan:   Persistent atrial fibrillation: The patient has no palpitations.  Her ventricular rate is well-controlled with metoprolol 25 mg twice a day.  We discussed the evaluation and management of atrial fibrillation.  Discussed rate control versus rhythm control.  After discussion, ablation of atrial fibrillation is recommended.  Meantime consider to have Watchman device at the same time.  The patient agreed with the plan.  The patient is referred to EP cardiologist to discuss ablation and Watchman device placement.  Continue metoprolol 25 mg twice a day. Her [id number] score is 5 due to CAD, hypertension, age and female.  Continue Eliquis 5 mg twice a day.  Coronary artery disease status post MI: She has no chest pain, occasional dyspnea on exertion.  Coronary angiogram demonstrated 50% stenosis in proximal LAD, mild disease in mid to distal LAD, no obstructive lesion.  Stress cardiac MRI was reported transmural myocardial infarction in distal anteroseptal, apical and distal inferior segments.  Her left ventricular ejection fraction is preserved 60%.  The patient no signs of congestive heart failure.  Discussed the treatment of coronary artery disease.  Start the statins.  Benign essential hypertension: Her blood pressure is controlled with amlodipine 5 mg daily, metoprolol 25 mg twice a day.  Dyslipidemia: Check lipid profile and AST today.  Start Lipitor 20 mg at bedtime.  Repeat lipid profile and liver function in 2 months.    Thank you for the opportunity to be involved in the care of Linda K Myhre. If you have any questions, please feel free to contact me.  I will see the patient again in 6 months and as  needed    Much or all of the text in this note was generated through the use of Dragon Dictate voice-to-text software. Errors in spelling or words which seem out of context are unintentional.   Sound alike errors, in particular, may have escaped editing.       History of Present Illness:   It is my pleasure to see Linda K Myhre at the [facility] clinic for routine cardiology follow-up. Linda K Myhre is a 79 year old female with a medical history of persistent atrial fibrillation atrial fibrillation, coronary artery disease status post MI, benign essential hypertension.    The patient states that she has occasional shortness of breath on exertion.  No obvious fatigue.  She denies chest pain, palpitations, dizziness, orthopnea, PND or leg edema.  Her blood pressure and heart rate are controlled.  No side effects from current medications.    Past Medical History:     Patient Active Problem List   Diagnosis     Hemorrhoids     Persistent atrial fibrillation (H)     Abnormal cardiovascular stress test     Primary hypertension     Osteopenia of multiple sites     Encounter to establish care       Past Surgical History:     Past Surgical History:   Procedure Laterality Date     COLONOSCOPY  2015     CV CORONARY ANGIOGRAM N/A 01/31/2025    Procedure: Coronary Angiogram;  Surgeon: Kolby Bo MD;  Location: [facility] CATH LAB CV     CV INSTANTANEOUS WAVE-FREE RATIO N/A 01/31/2025    Procedure: Instantaneous Wave-Free Ratio;  Surgeon: Kolby Bo MD;  Location: [facility] CATH LAB CV     CV LEFT HEART CATH N/A 01/31/2025    Procedure: Left Heart Catheterization;  Surgeon: Kolby Bo MD;  Location: [facility] CATH LAB CV       Family History:   Reviewed: Family history of coronary artery disease, no family history of atrial fibrillation.    Social History:    reports that she quit smoking about 15 years ago. Her smoking use included cigarettes. She started smoking about 59 years ago. She has a 22  pack-year smoking history. She has never used smokeless tobacco. She reports that she does not drink alcohol and does not use drugs.    Review of Systems:   12 systems are reviewed negative except for in HPI.    Meds:     Current Outpatient Medications:      amLODIPine (NORVASC) 5 MG tablet, Take 1 tablet (5 mg) by mouth daily., Disp: 90 tablet, Rfl: 1     apixaban ANTICOAGULANT (ELIQUIS ANTICOAGULANT) 5 MG tablet, Take 1 tablet (5 mg) by mouth 2 times daily., Disp: 60 tablet, Rfl: 11     atorvastatin (LIPITOR) 20 MG tablet, Take 1 tablet (20 mg) by mouth daily., Disp: 90 tablet, Rfl: 3     metoprolol tartrate (LOPRESSOR) 25 MG tablet, Take 1 tablet (25 mg) by mouth 2 times daily., Disp: 60 tablet, Rfl: 11     Allergies:   Patient has no known allergies.    Objective:      Physical Exam  80.7 kg (178 lb)     Body mass index is 28.73 kg/m .  /72 (BP Location: Right arm, Patient Position: Sitting, Cuff Size: Adult Large)   Pulse 65   Resp 14   Wt 80.7 kg (178 lb)   LMP  (LMP Unknown)   BMI 28.73 kg/m      General Appearance:   Awake, Alert, No acute distress.   HEENT:  Pupil equal, reactive to light. No scleral icterus; the mucous membranes were moist. No oral ulcers or thrush.    Neck: No cervical bruits. No JVD. No thyromegaly. No lymph node enlargement or tenderness.   Chest: The spine was straight. The chest was symmetric.   Lungs:   Respirations unlabored. Lungs are clear to auscultation. No crackles. No wheezing.   Cardiovascular:   IRRR, normal first and second heart sounds with no murmurs. No rubs or gallops.    Abdomen:  Soft. No tenderness. Non-distended. Bowels sounds are present   Extremities: Equal posterior tibial pulses. No leg edema.   Skin: No rashes or ulcers. Warm, Dry.   Musculoskeletal: No tenderness. No deformity.   Neurologic: Mood and affect are appropriate. No focal deficits.         EKG:  Personally reivewed  Atrial fibrillation with rapid ventricular response  Possible anterior  infarct, age undetermined  Abnormal ECG  No previous ECG for comparison    Cardiac Imaging Studies  Echo on November 22, 2024:  1.Left ventricular size and function are normal. The ejection  fraction is 60-65%.  2.There is apical akinesis.  3.Normal right ventricle size and systolic function.  4.The left atrium is borderline dilated.  5.No hemodynamically significant valvular abnormalities on 2D or color flow  imaging, mild leaks as below.  There is no comparison study available.    Stress cardiac MRI on January 23, 2025:  1.  Pharmacological Regdenoson stress cardiac MRI is positive for inducible myocardial ischemia in  mid to  distal septal, mid to distal inferoseptal and mid to distal inferior walls in a medium size. .  2.  Pharmacological Regdenoson stress ECG is negative for inducible myocardial ischemia.  The patient is in  atrial fibrillation.  3.  Normal left ventricular size and thin in apex and distal anteroseptal segment. There is akinetic in  apical , distal anteroseptal and distal inferior segments.  There is no LV mass/thrombus.  The estimated  left ventricular ejection fraction is 55-60%.  4.  There is transmural myocardial infarction in apical, distal anteroseptal and distal inferior segments.   The LV scar size is 16 %.  5.  Normal right ventricular size and systolic function.  6.  Moderately enlarged both atria.  7.  Mild mitral and tricuspid valve regurgitation.    Coronary angiogram on January 31, 2025:  50% stenosis in proximal LAD, 20% stenosis in the mid to distal LAD  Normal left main, LCx and RCA.    Zio patch monitor on November 8, 2024:  Zio monitoring from 10/25/2024 to 11/1/2024 (duration 7d 12h).  Continuous atrial fibrillation, ventricular rates 41 to 168bpm, average 69bpm.  Intermittent IVCD.  There were no pauses of greater than 3 seconds.  Rare premature ventricular contractions (isolated <1%).  Symptom triggers (2) correlated to atrial fibrillation with ventricular rate  61-114bpm.    Lab Review   Lab Results   Component Value Date     10/16/2024    .0 04/09/2013    CO2 25 10/16/2024    CO2 29.0 04/09/2013    BUN 11.4 10/16/2024    BUN 14.0 04/09/2013     Lab Results   Component Value Date    WBC 10.8 10/16/2024    HGB 12.9 10/16/2024    HCT 40.3 10/16/2024    MCV 92 10/16/2024     10/16/2024     Lab Results   Component Value Date    CHOL 189 05/02/2012    HDL 59 05/02/2012     05/02/2012     LDL Cholesterol Calculated   Date Value Ref Range Status   05/02/2012 112 <130 mg/dL Final     Lab Results   Component Value Date    TSH 3.06 10/16/2024                   Thank you for allowing me to participate in the care of your patient.      Sincerely,     Janie Calderon MD     [facility]  cc:   Janie Calderon MD  [facility] [address] [facility] [address]  [place] [address]

## 2025-02-18 NOTE — PROGRESS NOTES
Click to link to [facility] HEART CARE NOTE     Assessment/Plan:   Persistent atrial fibrillation: The patient has no palpitations.  Her ventricular rate is well-controlled with metoprolol 25 mg twice a day.  We discussed the evaluation and management of atrial fibrillation.  Discussed rate control versus rhythm control.  After discussion, ablation of atrial fibrillation is recommended.  Meantime consider to have Watchman device at the same time.  The patient agreed with the plan.  The patient is referred to EP cardiologist to discuss ablation and Watchman device placement.  Continue metoprolol 25 mg twice a day. Her [id number] score is 5 due to CAD, hypertension, age and female.  Continue Eliquis 5 mg twice a day.  Coronary artery disease status post MI: She has no chest pain, occasional dyspnea on exertion.  Coronary angiogram demonstrated 50% stenosis in proximal LAD, mild disease in mid to distal LAD, no obstructive lesion.  Stress cardiac MRI was reported transmural myocardial infarction in distal anteroseptal, apical and distal inferior segments.  Her left ventricular ejection fraction is preserved 60%.  The patient no signs of congestive heart failure.  Discussed the treatment of coronary artery disease.  Start the statins.  Benign essential hypertension: Her blood pressure is controlled with amlodipine 5 mg daily, metoprolol 25 mg twice a day.  Dyslipidemia: Check lipid profile and AST today.  Start Lipitor 20 mg at bedtime.  Repeat lipid profile and liver function in 2 months.    Thank you for the opportunity to be involved in the care of Linda K Myhre. If you have any questions, please feel free to contact me.  I will see the patient again in 6 months and as needed    Much or all of the text in this note was generated through the use of Dragon Dictate voice-to-text software. Errors in spelling or words which seem out of context are unintentional.   Sound alike errors, in  particular, may have escaped editing.       History of Present Illness:   It is my pleasure to see Linda K Myhre at the [facility] clinic for routine cardiology follow-up. Linda K Myhre is a 79 year old female with a medical history of persistent atrial fibrillation atrial fibrillation, coronary artery disease status post MI, benign essential hypertension.    The patient states that she has occasional shortness of breath on exertion.  No obvious fatigue.  She denies chest pain, palpitations, dizziness, orthopnea, PND or leg edema.  Her blood pressure and heart rate are controlled.  No side effects from current medications.    Past Medical History:     Patient Active Problem List   Diagnosis    Hemorrhoids    Persistent atrial fibrillation (H)    Abnormal cardiovascular stress test    Primary hypertension    Osteopenia of multiple sites    Encounter to establish care       Past Surgical History:     Past Surgical History:   Procedure Laterality Date    COLONOSCOPY  2015    CV CORONARY ANGIOGRAM N/A 01/31/2025    Procedure: Coronary Angiogram;  Surgeon: Kolby Bo MD;  Location: [facility] CATH LAB CV    CV INSTANTANEOUS WAVE-FREE RATIO N/A 01/31/2025    Procedure: Instantaneous Wave-Free Ratio;  Surgeon: Kolby Bo MD;  Location: [facility] CATH LAB CV    CV LEFT HEART CATH N/A 01/31/2025    Procedure: Left Heart Catheterization;  Surgeon: Kolby Bo MD;  Location: [facility] CATH LAB CV       Family History:   Reviewed: Family history of coronary artery disease, no family history of atrial fibrillation.    Social History:    reports that she quit smoking about 15 years ago. Her smoking use included cigarettes. She started smoking about 59 years ago. She has a 22 pack-year smoking history. She has never used smokeless tobacco. She reports that she does not drink alcohol and does not use drugs.    Review of Systems:   12 systems are reviewed negative except for in HPI.    Meds:     Current  Outpatient Medications:     amLODIPine (NORVASC) 5 MG tablet, Take 1 tablet (5 mg) by mouth daily., Disp: 90 tablet, Rfl: 1    apixaban ANTICOAGULANT (ELIQUIS ANTICOAGULANT) 5 MG tablet, Take 1 tablet (5 mg) by mouth 2 times daily., Disp: 60 tablet, Rfl: 11    atorvastatin (LIPITOR) 20 MG tablet, Take 1 tablet (20 mg) by mouth daily., Disp: 90 tablet, Rfl: 3    metoprolol tartrate (LOPRESSOR) 25 MG tablet, Take 1 tablet (25 mg) by mouth 2 times daily., Disp: 60 tablet, Rfl: 11     Allergies:   Patient has no known allergies.    Objective:      Physical Exam  80.7 kg (178 lb)     Body mass index is 28.73 kg/m .  /72 (BP Location: Right arm, Patient Position: Sitting, Cuff Size: Adult Large)   Pulse 65   Resp 14   Wt 80.7 kg (178 lb)   LMP  (LMP Unknown)   BMI 28.73 kg/m      General Appearance:   Awake, Alert, No acute distress.   HEENT:  Pupil equal, reactive to light. No scleral icterus; the mucous membranes were moist. No oral ulcers or thrush.    Neck: No cervical bruits. No JVD. No thyromegaly. No lymph node enlargement or tenderness.   Chest: The spine was straight. The chest was symmetric.   Lungs:   Respirations unlabored. Lungs are clear to auscultation. No crackles. No wheezing.   Cardiovascular:   IRRR, normal first and second heart sounds with no murmurs. No rubs or gallops.    Abdomen:  Soft. No tenderness. Non-distended. Bowels sounds are present   Extremities: Equal posterior tibial pulses. No leg edema.   Skin: No rashes or ulcers. Warm, Dry.   Musculoskeletal: No tenderness. No deformity.   Neurologic: Mood and affect are appropriate. No focal deficits.         EKG:  Personally reivewed  Atrial fibrillation with rapid ventricular response  Possible anterior infarct, age undetermined  Abnormal ECG  No previous ECG for comparison    Cardiac Imaging Studies  Echo on November 22, 2024:  1.Left ventricular size and function are normal. The ejection  fraction is 60-65%.  2.There is apical  akinesis.  3.Normal right ventricle size and systolic function.  4.The left atrium is borderline dilated.  5.No hemodynamically significant valvular abnormalities on 2D or color flow  imaging, mild leaks as below.  There is no comparison study available.    Stress cardiac MRI on January 23, 2025:  1.  Pharmacological Regdenoson stress cardiac MRI is positive for inducible myocardial ischemia in  mid to  distal septal, mid to distal inferoseptal and mid to distal inferior walls in a medium size. .  2.  Pharmacological Regdenoson stress ECG is negative for inducible myocardial ischemia.  The patient is in  atrial fibrillation.  3.  Normal left ventricular size and thin in apex and distal anteroseptal segment. There is akinetic in  apical , distal anteroseptal and distal inferior segments.  There is no LV mass/thrombus.  The estimated  left ventricular ejection fraction is 55-60%.  4.  There is transmural myocardial infarction in apical, distal anteroseptal and distal inferior segments.   The LV scar size is 16 %.  5.  Normal right ventricular size and systolic function.  6.  Moderately enlarged both atria.  7.  Mild mitral and tricuspid valve regurgitation.    Coronary angiogram on January 31, 2025:  50% stenosis in proximal LAD, 20% stenosis in the mid to distal LAD  Normal left main, LCx and RCA.    Zio patch monitor on November 8, 2024:  Zio monitoring from 10/25/2024 to 11/1/2024 (duration 7d 12h).  Continuous atrial fibrillation, ventricular rates 41 to 168bpm, average 69bpm.  Intermittent IVCD.  There were no pauses of greater than 3 seconds.  Rare premature ventricular contractions (isolated <1%).  Symptom triggers (2) correlated to atrial fibrillation with ventricular rate 61-114bpm.    Lab Review   Lab Results   Component Value Date     10/16/2024    .0 04/09/2013    CO2 25 10/16/2024    CO2 29.0 04/09/2013    BUN 11.4 10/16/2024    BUN 14.0 04/09/2013     Lab Results   Component Value Date    WBC  10.8 10/16/2024    HGB 12.9 10/16/2024    HCT 40.3 10/16/2024    MCV 92 10/16/2024     10/16/2024     Lab Results   Component Value Date    CHOL 189 05/02/2012    HDL 59 05/02/2012     05/02/2012     LDL Cholesterol Calculated   Date Value Ref Range Status   05/02/2012 112 <130 mg/dL Final     Lab Results   Component Value Date    TSH 3.06 10/16/2024

## 2025-02-25 ENCOUNTER — OFFICE VISIT (OUTPATIENT)
Dept: CARDIOLOGY | Facility: CLINIC | Age: 80
End: 2025-02-25
Payer: COMMERCIAL

## 2025-02-25 VITALS
BODY MASS INDEX: 28.28 KG/M2 | HEART RATE: 56 BPM | RESPIRATION RATE: 16 BRPM | HEIGHT: 66 IN | WEIGHT: 176 LBS | DIASTOLIC BLOOD PRESSURE: 54 MMHG | SYSTOLIC BLOOD PRESSURE: 158 MMHG

## 2025-02-25 DIAGNOSIS — I48.91 NEW ONSET ATRIAL FIBRILLATION (H): ICD-10-CM

## 2025-02-25 DIAGNOSIS — I48.19 PERSISTENT ATRIAL FIBRILLATION (H): Primary | ICD-10-CM

## 2025-02-25 NOTE — LETTER
2025    Chanel Causey MD  [address] [address]    RE: Linda K Myhre       Dear Colleague,     I had the pleasure of seeing Linda K Myhre in the [facility].     [facility]  Cardiac Electrophysiology  [address]  [place] [address]   Office: [phone]  Fax: [phone]     Patient: Linda K Myhre   : 1945       CHIEF COMPLAINT/REASON FOR VISIT  Persistent vs longstanding persistent atrial fibrillation      Assessment/Recommendations     Stage 3B/Persistent vs Stage 3C/Longstanding persistent atrial fibrillation - asymptomatic, rate controlled, normal ventricular function.  Atrial fibrillation is associated with increased risk of stroke, heart failure and mortality  [id number] 5, HAS-BLED 2  We reviewed atrial fibrillation, managing stroke risk via anticoagulation or LAAO, managing heart failure risk, rate control, cardioversion, antiarrhythmic drug therapy, and catheter ablation.  Antiarrhythmic drug options are limited due to CAD with prior MI.  We discussed atrial fibrillation ablation procedures, anticipated success rates, the potential need for re-do ablation vs addition of anti-arrhythmic drugs, procedural risks (including groin bleeding, vascular injury, tamponade, phrenic or esophageal injury, stroke, pulmonary vein stenosis) and recovery expectations.  - she will take some time to consider options including rate control, rhythm control (ablation vs antiarrhythmic drug therapy and cardioversion) and stroke risk prevention (anticoagulation vs percutaneous left atrial appendage occlusion), and will let us know with questions or decision as to how she would like to proceed.  - continue metoprolol 25mg twice daily  - continue apixaban 5mg twice daily    Follow up: as above           History of Present Illness   Linda K Myhre is a 79 year old female with persistent atrial fibrillation, CAD with prior MI,  "LAD-PA fistula, HTN referred by Dr. Calderon for consultation regarding atrial fibrillation.    Mrs. Myhre's atrial fibrillation history is as summarized below:  Symptoms: unclear  Symptom onset date: n/a  Diagnosis date: 10/16/2024 at ER visit for transient nausea, diaphoresis, lightheadedness  Admissions/ER visits: 10/16/2024  Prior medical therapies: no AADs  Prior DCCVs: none  Prior ablations: none  Percutaneous left atrial appendage occlusion: none    She notes feeling at her baseline.  She does light exercise. She denies significant exertional dyspnea, palpitations, elisha syncope.  She notes one instance of chest pain awakening her from sleep in approximately 2023.       Physical Examination  Review of Systems   VITALS: BP (!) 158/54 (BP Location: Left arm, Patient Position: Sitting, Cuff Size: Adult Regular)   Pulse 56   Resp 16   Ht 1.676 m (5' 6\")   Wt 79.8 kg (176 lb)   LMP  (LMP Unknown)   BMI 28.41 kg/m    Wt Readings from Last 3 Encounters:   02/18/25 80.7 kg (178 lb)   02/07/25 79.5 kg (175 lb 3.2 oz)   01/31/25 79.4 kg (175 lb)     CONSTITUTIONAL: well nourished, comfortable, no distress  EYES:  Conjunctivae pink, sclerae clear.    E/N/T:  Oral mucosa pink  RESPIRATORY:  Respiratory effort is normal  CARDIOVASCULAR:  irregular, normal S1 and S2  GASTROINTESTINAL:  Abdomen without masses or tenderness  EXTREMITIES:  No clubbing or cyanosis.    MUSCULOSKELETAL:  Overall grossly normal muscle strength  SKIN:  Overall, skin warm and dry, no lesions.  NEURO/PSYCH:  Oriented x 3 with normal affect.   Constitutional:  No weight loss or loss of appetite    Eyes:  No difficulty with vision, no double vision, no dry eyes  ENT:  No sore throat, difficulty swallowing; changes in hearing or tinnitus  Cardiovascular: As detailed above  Respiratory:  No cough  Musculoskeletal  No joint pain, muscle aches  Neurologic:  No syncope, lightheadedness, fainting spells   Hematologic: No easy bruising, excessive bleeding " tendency   Gastrointestinal:  No jaundice, abdominal pain or abdominal bloating  Genitourinary: No changes in urinary habits, no trouble urinating    Psychiatric: No anxiety or depression      Medical History  Surgical History   Past Medical History:   Diagnosis Date     Heart disease      Hypertension     Past Surgical History:   Procedure Laterality Date     COLONOSCOPY  2015     CV CORONARY ANGIOGRAM N/A 01/31/2025    Procedure: Coronary Angiogram;  Surgeon: Kolby Bo MD;  Location: [facility] CATH LAB CV     CV INSTANTANEOUS WAVE-FREE RATIO N/A 01/31/2025    Procedure: Instantaneous Wave-Free Ratio;  Surgeon: Kolby Bo MD;  Location: [facility] CATH LAB CV     CV LEFT HEART CATH N/A 01/31/2025    Procedure: Left Heart Catheterization;  Surgeon: Kolby Bo MD;  Location: [facility] CATH LAB CV         Family History Social History   Family History   Problem Relation Age of Onset     Hypertension Mother      Obesity Mother      Coronary Artery Disease Brother      Obesity Daughter         Social History     Tobacco Use     Smoking status: Former     Current packs/day: 0.00     Average packs/day: 0.5 packs/day for 44.0 years (22.0 ttl pk-yrs)     Types: Cigarettes     Start date: 9/1/1965     Quit date: 4/9/2009     Years since quitting: 15.8     Smokeless tobacco: Never   Substance Use Topics     Alcohol use: No     Drug use: No         Medications  Allergies     Current Outpatient Medications:      amLODIPine (NORVASC) 5 MG tablet, Take 1 tablet (5 mg) by mouth daily., Disp: 90 tablet, Rfl: 1     apixaban ANTICOAGULANT (ELIQUIS ANTICOAGULANT) 5 MG tablet, Take 1 tablet (5 mg) by mouth 2 times daily., Disp: 60 tablet, Rfl: 11     atorvastatin (LIPITOR) 20 MG tablet, Take 1 tablet (20 mg) by mouth daily., Disp: 90 tablet, Rfl: 3     metoprolol tartrate (LOPRESSOR) 25 MG tablet, Take 1 tablet (25 mg) by mouth 2 times daily., Disp: 60 tablet, Rfl: 11   No Known Allergies       Lab Results    Chemistry CBC  "Cardiac Enzymes/BNP/TSH/INR   Recent Labs   Lab Test 01/29/25  1412      POTASSIUM 4.7   CHLORIDE 105   CO2 28   GLC 85   BUN 16.0   CR 0.83   GFRESTIMATED 72   VADIM 9.7     Recent Labs   Lab Test 01/29/25  1412 10/16/24  1535   CR 0.83 0.96*          Recent Labs   Lab Test 01/29/25  1412   WBC 6.4   HGB 13.8   HCT 43.4   MCV 94        Recent Labs   Lab Test 01/29/25  1412 10/16/24  1535   HGB 13.8 12.9    No results for input(s): \"TROPONINI\" in the last 09084 hours.  No results for input(s): \"BNP\", \"NTBNPI\", \"NTBNP\" in the last 51341 hours.  Recent Labs   Lab Test 10/16/24  1535   TSH 3.06     No results for input(s): \"INR\" in the last 97945 hours.      Data Review    ECGs (tracings independently reviewed)  1/31/2025 - AF, 59bpm, QC 425ms  10/16/2024 - AF, 103bpm    Zio monitoring from 10/25/2024 to 11/1/2024 (duration 7d 12h) (independently reviewed)  Continuous atrial fibrillation, ventricular rates 41 to 168bpm, average 69bpm.  Intermittent IVCD.  There were no pauses of greater than 3 seconds.  Rare premature ventricular contractions (isolated <1%).  Symptom triggers (2) correlated to atrial fibrillation with ventricular rate 61-114bpm.    1/23/2025 cardiac MRI  1.  Pharmacological Regdenoson stress cardiac MRI is positive for inducible myocardial ischemia in  mid to  distal septal, mid to distal inferoseptal and mid to distal inferior walls in a medium size. .  2.  Pharmacological Regdenoson stress ECG is negative for inducible myocardial ischemia.  The patient is in  atrial fibrillation.  3.  Normal left ventricular size and thin in apex and distal anteroseptal segment. There is akinetic in  apical , distal anteroseptal and distal inferior segments.  There is no LV mass/thrombus.  The estimated  left ventricular ejection fraction is 55-60%.  4.  There is transmural myocardial infarction in apical, distal anteroseptal and distal inferior segments.   The LV scar size is 16 %.  5.  Normal right " ventricular size and systolic function.  6.  Moderately enlarged both atria.  7.  Mild mitral and tricuspid valve regurgitation.     1/31/2025 coronary angiogram  1.  Normal left heart filling pressures.  2.  Nonobstructive coronary artery disease of the left anterior descending (DFR 0.94)  3.  Otherwise nonobstructive coronary artery disease.  4.  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       Cc: Janie Calderon MD, Chanel Causey MD Amila Dilusha William, MD  2/25/2025  4:25 PM        Thank you for allowing me to participate in the care of your patient.      Sincerely,     Ranjith Simmons MD     [facility]  cc:   Janie Calderon MD  [address] [address]

## 2025-02-25 NOTE — PATIENT INSTRUCTIONS
[facility]  Cardiac Electrophysiology  [address]  [place] [address]   Office: [phone]  Fax: [phone]     Thank you for seeing us in clinic today - it is a pleasure to be a part of your care team.  Below is a summary of our plan from today's visit.       You have atrial fibrillation which appears to be largely asymptomatic with well controlled heart rates and normal heart function.  We reviewed atrial fibrillation, treatment options (rate control vs ablation vs antiarrhythmic drug therapy), and long term stroke risk prevention (blood thinner vs Watchman device).    We will plan for the following:  - consider options including ongoing rate control vs rhythm control (ablation vs antiarrhythmic drug therapy and cardioversion), and stroke risk prevention (blood thinner vs percutaneous left atrial appendage occlusion), and will let us know with questions or decision as to how you would like to proceed.  - continue metoprolol 25mg twice daily  - continue apixaban 5mg twice daily     Please do not hesitate to be in touch with our office at [phone] with any questions that may arise.       Thank you for trusting us with your care,    Ranjith Simmons MD  Clinical Cardiac Electrophysiology  [facility]  [address]  [place] [address]   Office: [phone]  Fax: [phone]        ATRIAL FIBRILLATION: Patient Information    What is atrial fibrillation?  Atrial fibrillation (AF, A-fib) is a common heart rhythm problem (arrhythmia) occurring within the upper chambers of the heart (the atria).  In normal rhythm, the upper and lower chambers of the heart are electrically driven to contract in a coordinated sequence.  In atrial fibrillation, the atria lose their ability to contract because of rapid and chaotic electrical activity.  The lower chambers of the heart (the ventricles) continue to pump blood throughout the body,  though with irregular and often faster rate due to the chaotic activity within the atria.        How do I know if I have atrial fibrillation?   Some people may feel their heart beating faster, harder, or irregularly while in atrial fibrillation.  Others may be lightheaded, fatigued, feel weak or tired or become more short of breath especially with activities.  Some patients have no symptoms at all.  Atrial fibrillation may be found due to an irregular pulse or on an electrocardiogram (ECG). Atrial fibrillation can start and stop on its own, and episodes can last from seconds to several months.      How common is atrial fibrillation?   An estimated 3-6 million people in the [place] have atrial fibrillation.  Atrial fibrillation is a common heart rhythm problem for older persons, affecting as estimated 12-15% of people over the age of 65 years of age.    What causes atrial fibrillation?   Age is the most important risk factor for atrial fibrillation.  Atrial fibrillation is more common in people with other heart disease, high blood pressure, diabetes, obesity, sleep apnea and in people who regularly consume alcohol.  Surgery, lung disease, or thyroid problems can lead to atrial fibrillation.  Atrial fibrillation has multiple possible causes, and in most cases a single cause cannot be found.  Atrial fibrillation is a progressive condition, usually starting with at an early stage with short and infrequent episodes.  In later stages of disease, more frequent and longer lasting episodes of atrial fibrillation occur, ultimately culminating in episodes which do not spontaneously terminate.  Generally, more enlargement and scarring within the upper chambers of the heart is observed as atrial fibrillation progresses from early to late-stage disease.    How is atrial fibrillation diagnosed and evaluated?    Because of its start-stop nature, atrial fibrillation can be challenging to diagnose.  Atrial fibrillation is most  commonly diagnosed via cardiac rhythm recordings - either an ECG or wearable cardiac rhythm monitor.  For patients with pacemakers, defibrillators or implantable loop recorders, atrial fibrillation may be recorded via these devices.  Recently, commercially available devices (eg. Apple Watch, [employer] device, certain FitBit devices, others) can allow patients to take 30 second cardiac rhythm recordings which may document atrial fibrillation.  Once atrial fibrillation is diagnosed, additional tests include blood tests and an echocardiogram.  The echocardiogram uses ultrasound to look at your heart to assess your cardiac function and evaluate for other heart disease.  Additional evaluation may include CT or MRI studies.    Is atrial fibrillation dangerous?   Atrial fibrillation is not usually a life-threatening arrhythmia.  The most serious consequences of atrial fibrillation including stroke and worsening of overall cardiac function.  While in atrial fibrillation, the upper cardiac chambers do not contract normally, resulting in slower blood flow and increased risk of clot formation.  If this blood clot becomes detached from the heart a stroke can occur.  Unfortunately, stroke can be the first sign of atrial fibrillation for some people.  With a stroke, you may notice abnormal sensation, weakness on one side of the body or face, changes in your vision or speech.  If you have any of these signs, you should contact EMS and be evaluated in an emergency room as soon as possible.      How is atrial fibrillation treated?     Several treatment options exist for suppressing atrial fibrillation - however, it is not an easily curable arrhythmia.  The first goal in managing atrial fibrillation is to minimize stroke risk.  The second goal is to improve symptoms associated with atrial fibrillation.  Finally, in patients with reduced cardiac function, maintaining normal rhythm can help improve cardiac function.      Blood thinners  are used to reduce the risk of stroke in patients with high estimated stroke risk related to atrial fibrillation.  For patients at higher risk of bleeding related to blood thinner use, implantable devices may be an option to reduce stroke risk without the need for long term blood thinner use.      Atrial fibrillation can be managed via two strategies: rate control and rhythm control.  In a rate control strategy, continued atrial fibrillation is accepted and medications (eg. beta-blockers or calcium channel blockers) are used to control the lower chamber rate.  In a rhythm control strategy, anti-arrhythmic medications or catheter ablation are used to maintain normal cardiac rhythm and slow disease progression by suppressing atrial fibrillation.  A procedure called a cardioversion, in which an electric shock is delivered through patches placed on the chest wall while under deep sedation, can be performed to temporarily restore normal cardiac rhythm, though does not address the chance of atrial fibrillation recurrence.  Treatments are more effective for earlier rather than later stage atrial fibrillation.  Lifestyle modifications (maintaining a healthy weight, aerobic exercise, diagnosing and treating sleep apnea, and minimizing alcohol intake) are important elements of atrial fibrillation rhythm control.     What is catheter ablation for atrial fibrillation?  Cardiac catheter ablation is a commonly performed, minimally invasive procedure performed by a cardiac electrophysiologist to treat many different cardiac rhythm abnormalities.  During catheter ablation, long, thin, flexible tubes are advanced into the heart via small sheaths inserted into the femoral veins and thermal energy (either heating or cooling) is applied within the heart to disrupt abnormal electrical activity.  Atrial fibrillation ablation is performed under general anesthesia, with procedures generally taking approximately 2-3 hours.  Patients are  typically observed for 3-5 hours after the ablation, and in most cases can be discharged home the same day.  Atrial fibrillation ablation is associated with better outcomes (mortality, cardiovascular hospitalizations, atrial arrhythmia recurrences) compared to antiarrhythmic drug therapy.  However, atrial fibrillation recurrences are not uncommon, and repeat catheter ablation may be offered.  Your electrophysiology team can review atrial fibrillation ablation, anticipated success rates, risks, and recovery expectations with you.    What are anti-arrhythmic medications?  Anti-arrhythmic medications are specialized drugs which alter cardiac electrical functioning to suppress arrhythmias.  There are several anti-arrhythmic medications available, each with its own success rate and side effects.  Some anti-arrhythmic medications are less effective though safer to use, others are more effective though have serious potential toxicities.  Atrial fibrillation recurrences are common and may require dose adjustment or change in antiarrhythmic therapy.  Your electrophysiology team will carefully consider which medication would be the best and safest for your particular case.      Can I live a normal life?    The goal of atrial fibrillation management is for patients to live normal lives without being limited by symptoms related to atrial fibrillation.    Are any additional educational resources available?  There are a number of excellent atrial fibrillation education resources available to you online.  A few options you may wish to review include:  hrsonline.org/guide-atrial-fibrillation  afibmatters.org  getsmartaboutafib.com  stopaf.com    What comes next?    Consider your management options and let us know how we can help in your decision process.  Please take medications as they have been prescribed.  You should also get any tests that may have been ordered for you.      When to Call Your Doctor or seek emergency  care:  Call your doctor or seek emergency care if you have any significant changes with the following:  Weakness  Dizziness  Fainting  Fatigue  Shortness of breath  Chest pain with increased activity  If you are concerned that your heart rate is too fast or too slow  Bleeding that does not stop in 10 minutes  Coughing or throwing up blood  Bloody diarrhea or bleeding hemorrhoids  Dark-colored urine or black stool  Allergic reactions:  Rash  Itching  Swelling  Trouble breathing or swallowing      Please call the [facility] at [phone] if you have concerns about your symptoms, your medicines, or your follow-up appointments.

## 2025-02-25 NOTE — PROGRESS NOTES
[facility]  Cardiac Electrophysiology  [address]  [place] [address]   Office: [phone]  Fax: [phone]     Patient: Linda K Myhre   : 1945       CHIEF COMPLAINT/REASON FOR VISIT  Persistent vs longstanding persistent atrial fibrillation      Assessment/Recommendations     Stage 3B/Persistent vs Stage 3C/Longstanding persistent atrial fibrillation - asymptomatic, rate controlled, normal ventricular function.  Atrial fibrillation is associated with increased risk of stroke, heart failure and mortality  [id number] 5, HAS-BLED 2  We reviewed atrial fibrillation, managing stroke risk via anticoagulation or LAAO, managing heart failure risk, rate control, cardioversion, antiarrhythmic drug therapy, and catheter ablation.  Antiarrhythmic drug options are limited due to CAD with prior MI.  We discussed atrial fibrillation ablation procedures, anticipated success rates, the potential need for re-do ablation vs addition of anti-arrhythmic drugs, procedural risks (including groin bleeding, vascular injury, tamponade, phrenic or esophageal injury, stroke, pulmonary vein stenosis) and recovery expectations.  - she will take some time to consider options including rate control, rhythm control (ablation vs antiarrhythmic drug therapy and cardioversion) and stroke risk prevention (anticoagulation vs percutaneous left atrial appendage occlusion), and will let us know with questions or decision as to how she would like to proceed.  - continue metoprolol 25mg twice daily  - continue apixaban 5mg twice daily    Follow up: as above           History of Present Illness   Linda K Myhre is a 79 year old female with persistent atrial fibrillation, CAD with prior MI, LAD-PA fistula, HTN referred by Dr. Calderon for consultation regarding atrial fibrillation.    Mrs. Myhre's atrial fibrillation history is as summarized below:  Symptoms: unclear  Symptom onset date: n/a  Diagnosis date:  "10/16/2024 at ER visit for transient nausea, diaphoresis, lightheadedness  Admissions/ER visits: 10/16/2024  Prior medical therapies: no AADs  Prior DCCVs: none  Prior ablations: none  Percutaneous left atrial appendage occlusion: none    She notes feeling at her baseline.  She does light exercise. She denies significant exertional dyspnea, palpitations, elisha syncope.  She notes one instance of chest pain awakening her from sleep in approximately 2023.       Physical Examination  Review of Systems   VITALS: BP (!) 158/54 (BP Location: Left arm, Patient Position: Sitting, Cuff Size: Adult Regular)   Pulse 56   Resp 16   Ht 1.676 m (5' 6\")   Wt 79.8 kg (176 lb)   LMP  (LMP Unknown)   BMI 28.41 kg/m    Wt Readings from Last 3 Encounters:   02/18/25 80.7 kg (178 lb)   02/07/25 79.5 kg (175 lb 3.2 oz)   01/31/25 79.4 kg (175 lb)     CONSTITUTIONAL: well nourished, comfortable, no distress  EYES:  Conjunctivae pink, sclerae clear.    E/N/T:  Oral mucosa pink  RESPIRATORY:  Respiratory effort is normal  CARDIOVASCULAR:  irregular, normal S1 and S2  GASTROINTESTINAL:  Abdomen without masses or tenderness  EXTREMITIES:  No clubbing or cyanosis.    MUSCULOSKELETAL:  Overall grossly normal muscle strength  SKIN:  Overall, skin warm and dry, no lesions.  NEURO/PSYCH:  Oriented x 3 with normal affect.   Constitutional:  No weight loss or loss of appetite    Eyes:  No difficulty with vision, no double vision, no dry eyes  ENT:  No sore throat, difficulty swallowing; changes in hearing or tinnitus  Cardiovascular: As detailed above  Respiratory:  No cough  Musculoskeletal  No joint pain, muscle aches  Neurologic:  No syncope, lightheadedness, fainting spells   Hematologic: No easy bruising, excessive bleeding tendency   Gastrointestinal:  No jaundice, abdominal pain or abdominal bloating  Genitourinary: No changes in urinary habits, no trouble urinating    Psychiatric: No anxiety or depression      Medical History  Surgical " History   Past Medical History:   Diagnosis Date    Heart disease     Hypertension     Past Surgical History:   Procedure Laterality Date    COLONOSCOPY  2015    CV CORONARY ANGIOGRAM N/A 01/31/2025    Procedure: Coronary Angiogram;  Surgeon: Kolby Bo MD;  Location: [facility] CATH LAB CV    CV INSTANTANEOUS WAVE-FREE RATIO N/A 01/31/2025    Procedure: Instantaneous Wave-Free Ratio;  Surgeon: Kolby Bo MD;  Location: [facility] CATH LAB CV    CV LEFT HEART CATH N/A 01/31/2025    Procedure: Left Heart Catheterization;  Surgeon: Kolby Bo MD;  Location: [facility] CATH LAB CV         Family History Social History   Family History   Problem Relation Age of Onset    Hypertension Mother     Obesity Mother     Coronary Artery Disease Brother     Obesity Daughter         Social History     Tobacco Use    Smoking status: Former     Current packs/day: 0.00     Average packs/day: 0.5 packs/day for 44.0 years (22.0 ttl pk-yrs)     Types: Cigarettes     Start date: 9/1/1965     Quit date: 4/9/2009     Years since quitting: 15.8    Smokeless tobacco: Never   Substance Use Topics    Alcohol use: No    Drug use: No         Medications  Allergies     Current Outpatient Medications:     amLODIPine (NORVASC) 5 MG tablet, Take 1 tablet (5 mg) by mouth daily., Disp: 90 tablet, Rfl: 1    apixaban ANTICOAGULANT (ELIQUIS ANTICOAGULANT) 5 MG tablet, Take 1 tablet (5 mg) by mouth 2 times daily., Disp: 60 tablet, Rfl: 11    atorvastatin (LIPITOR) 20 MG tablet, Take 1 tablet (20 mg) by mouth daily., Disp: 90 tablet, Rfl: 3    metoprolol tartrate (LOPRESSOR) 25 MG tablet, Take 1 tablet (25 mg) by mouth 2 times daily., Disp: 60 tablet, Rfl: 11   No Known Allergies       Lab Results    Chemistry CBC Cardiac Enzymes/BNP/TSH/INR   Recent Labs   Lab Test 01/29/25  1412      POTASSIUM 4.7   CHLORIDE 105   CO2 28   GLC 85   BUN 16.0   CR 0.83   GFRESTIMATED 72   VADIM 9.7     Recent Labs   Lab Test 01/29/25  1412 10/16/24  1535   CR  "0.83 0.96*          Recent Labs   Lab Test 01/29/25  1412   WBC 6.4   HGB 13.8   HCT 43.4   MCV 94        Recent Labs   Lab Test 01/29/25  1412 10/16/24  1535   HGB 13.8 12.9    No results for input(s): \"TROPONINI\" in the last 90158 hours.  No results for input(s): \"BNP\", \"NTBNPI\", \"NTBNP\" in the last 84151 hours.  Recent Labs   Lab Test 10/16/24  1535   TSH 3.06     No results for input(s): \"INR\" in the last 98724 hours.      Data Review    ECGs (tracings independently reviewed)  1/31/2025 - AF, 59bpm, QC 425ms  10/16/2024 - AF, 103bpm    Zio monitoring from 10/25/2024 to 11/1/2024 (duration 7d 12h) (independently reviewed)  Continuous atrial fibrillation, ventricular rates 41 to 168bpm, average 69bpm.  Intermittent IVCD.  There were no pauses of greater than 3 seconds.  Rare premature ventricular contractions (isolated <1%).  Symptom triggers (2) correlated to atrial fibrillation with ventricular rate 61-114bpm.    1/23/2025 cardiac MRI  1.  Pharmacological Regdenoson stress cardiac MRI is positive for inducible myocardial ischemia in  mid to  distal septal, mid to distal inferoseptal and mid to distal inferior walls in a medium size. .  2.  Pharmacological Regdenoson stress ECG is negative for inducible myocardial ischemia.  The patient is in  atrial fibrillation.  3.  Normal left ventricular size and thin in apex and distal anteroseptal segment. There is akinetic in  apical , distal anteroseptal and distal inferior segments.  There is no LV mass/thrombus.  The estimated  left ventricular ejection fraction is 55-60%.  4.  There is transmural myocardial infarction in apical, distal anteroseptal and distal inferior segments.   The LV scar size is 16 %.  5.  Normal right ventricular size and systolic function.  6.  Moderately enlarged both atria.  7.  Mild mitral and tricuspid valve regurgitation.     1/31/2025 coronary angiogram  1.  Normal left heart filling pressures.  2.  Nonobstructive coronary artery " disease of the left anterior descending (DFR 0.94)  3.  Otherwise nonobstructive coronary artery disease.  4.  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       Cc: Janie Calderon MD, Chanel Causey MD Amila Dilusha William, MD  2/25/2025  4:25 PM

## 2025-03-18 ENCOUNTER — PATIENT OUTREACH (OUTPATIENT)
Dept: CARE COORDINATION | Facility: CLINIC | Age: 80
End: 2025-03-18
Payer: COMMERCIAL

## 2025-04-22 ENCOUNTER — LAB (OUTPATIENT)
Dept: CARDIOLOGY | Facility: CLINIC | Age: 80
End: 2025-04-22
Payer: COMMERCIAL

## 2025-04-22 DIAGNOSIS — I25.10 CORONARY ARTERY DISEASE INVOLVING NATIVE CORONARY ARTERY OF NATIVE HEART WITHOUT ANGINA PECTORIS: ICD-10-CM

## 2025-04-22 LAB
ALT SERPL W P-5'-P-CCNC: 71 U/L (ref 0–50)
CHOLEST SERPL-MCNC: 96 MG/DL
FASTING STATUS PATIENT QL REPORTED: YES
HDLC SERPL-MCNC: 56 MG/DL
LDLC SERPL CALC-MCNC: 30 MG/DL
NONHDLC SERPL-MCNC: 40 MG/DL
TRIGL SERPL-MCNC: 49 MG/DL

## 2025-04-22 PROCEDURE — 84460 ALANINE AMINO (ALT) (SGPT): CPT

## 2025-04-22 PROCEDURE — 36415 COLL VENOUS BLD VENIPUNCTURE: CPT

## 2025-04-22 PROCEDURE — 80061 LIPID PANEL: CPT

## 2025-04-23 DIAGNOSIS — R74.01 ELEVATED ALT MEASUREMENT: Primary | ICD-10-CM

## 2025-04-23 DIAGNOSIS — I25.10 CORONARY ARTERY DISEASE INVOLVING NATIVE CORONARY ARTERY OF NATIVE HEART WITHOUT ANGINA PECTORIS: ICD-10-CM

## 2025-04-23 RX ORDER — ATORVASTATIN CALCIUM 10 MG/1
10 TABLET, FILM COATED ORAL DAILY
Qty: 90 TABLET | Refills: 2 | Status: SHIPPED | OUTPATIENT
Start: 2025-04-23

## 2025-05-05 ENCOUNTER — MYC REFILL (OUTPATIENT)
Dept: INTERNAL MEDICINE | Facility: CLINIC | Age: 80
End: 2025-05-05
Payer: COMMERCIAL

## 2025-05-05 DIAGNOSIS — I10 PRIMARY HYPERTENSION: ICD-10-CM

## 2025-05-06 RX ORDER — AMLODIPINE BESYLATE 5 MG/1
5 TABLET ORAL DAILY
Qty: 90 TABLET | Refills: 1 | OUTPATIENT
Start: 2025-05-06

## 2025-05-07 ENCOUNTER — RESULTS FOLLOW-UP (OUTPATIENT)
Dept: CARDIOLOGY | Facility: CLINIC | Age: 80
End: 2025-05-07

## 2025-05-07 ENCOUNTER — LAB (OUTPATIENT)
Dept: LAB | Facility: HOSPITAL | Age: 80
End: 2025-05-07
Payer: COMMERCIAL

## 2025-05-07 DIAGNOSIS — R74.01 ELEVATED ALT MEASUREMENT: ICD-10-CM

## 2025-05-07 LAB
ALBUMIN SERPL BCG-MCNC: 4.2 G/DL (ref 3.5–5.2)
ALP SERPL-CCNC: 106 U/L (ref 40–150)
ALT SERPL W P-5'-P-CCNC: 41 U/L (ref 0–50)
AST SERPL W P-5'-P-CCNC: 29 U/L (ref 0–45)
BILIRUB DIRECT SERPL-MCNC: 0.22 MG/DL (ref 0–0.3)
BILIRUB SERPL-MCNC: 0.6 MG/DL
PROT SERPL-MCNC: 7.4 G/DL (ref 6.4–8.3)

## 2025-05-07 PROCEDURE — 80076 HEPATIC FUNCTION PANEL: CPT

## 2025-05-07 PROCEDURE — 36415 COLL VENOUS BLD VENIPUNCTURE: CPT

## 2025-06-20 PROBLEM — J06.9 VIRAL URI: Status: ACTIVE | Noted: 2025-06-20

## 2025-06-20 PROBLEM — Z76.89 ENCOUNTER TO ESTABLISH CARE: Status: RESOLVED | Noted: 2025-02-07 | Resolved: 2025-06-20

## 2025-06-20 PROBLEM — Z00.00 ENCOUNTER FOR MEDICARE ANNUAL WELLNESS EXAM: Status: ACTIVE | Noted: 2025-06-20

## 2025-06-23 ENCOUNTER — RESULTS FOLLOW-UP (OUTPATIENT)
Dept: INTERNAL MEDICINE | Facility: CLINIC | Age: 80
End: 2025-06-23

## 2025-06-23 DIAGNOSIS — M85.89 OSTEOPENIA OF MULTIPLE SITES: Primary | ICD-10-CM

## 2025-06-23 DIAGNOSIS — E55.9 VITAMIN D DEFICIENCY: ICD-10-CM

## 2025-06-23 RX ORDER — ERGOCALCIFEROL 1.25 MG/1
50000 CAPSULE, LIQUID FILLED ORAL WEEKLY
Qty: 8 CAPSULE | Refills: 0 | Status: SHIPPED | OUTPATIENT
Start: 2025-06-23 | End: 2025-08-12

## 2025-08-01 ENCOUNTER — MYC REFILL (OUTPATIENT)
Dept: CARDIOLOGY | Facility: CLINIC | Age: 80
End: 2025-08-01
Payer: COMMERCIAL

## 2025-08-01 DIAGNOSIS — I25.10 CORONARY ARTERY DISEASE INVOLVING NATIVE CORONARY ARTERY OF NATIVE HEART WITHOUT ANGINA PECTORIS: ICD-10-CM

## 2025-08-05 RX ORDER — ATORVASTATIN CALCIUM 10 MG/1
10 TABLET, FILM COATED ORAL DAILY
Qty: 90 TABLET | Refills: 2 | OUTPATIENT
Start: 2025-08-05

## 2025-08-14 ENCOUNTER — OFFICE VISIT (OUTPATIENT)
Dept: CARDIOLOGY | Facility: CLINIC | Age: 80
End: 2025-08-14
Payer: COMMERCIAL

## 2025-08-14 VITALS
BODY MASS INDEX: 29.25 KG/M2 | HEIGHT: 66 IN | HEART RATE: 67 BPM | DIASTOLIC BLOOD PRESSURE: 50 MMHG | WEIGHT: 182 LBS | SYSTOLIC BLOOD PRESSURE: 136 MMHG | RESPIRATION RATE: 16 BRPM

## 2025-08-14 DIAGNOSIS — I48.0 PAROXYSMAL ATRIAL FIBRILLATION (H): Primary | ICD-10-CM

## 2025-08-14 DIAGNOSIS — I10 PRIMARY HYPERTENSION: ICD-10-CM

## 2025-08-14 DIAGNOSIS — E78.5 DYSLIPIDEMIA, GOAL LDL BELOW 70: ICD-10-CM

## 2025-08-14 DIAGNOSIS — I25.10 CORONARY ARTERY DISEASE INVOLVING NATIVE CORONARY ARTERY OF NATIVE HEART WITHOUT ANGINA PECTORIS: ICD-10-CM

## 2025-08-14 RX ORDER — METOPROLOL TARTRATE 25 MG/1
25 TABLET, FILM COATED ORAL 2 TIMES DAILY
Qty: 180 TABLET | Refills: 4 | Status: SHIPPED | OUTPATIENT
Start: 2025-08-14

## 2025-08-14 RX ORDER — ATORVASTATIN CALCIUM 10 MG/1
10 TABLET, FILM COATED ORAL DAILY
Qty: 90 TABLET | Refills: 4 | Status: SHIPPED | OUTPATIENT
Start: 2025-08-14

## 2025-08-14 RX ORDER — AMLODIPINE BESYLATE 5 MG/1
5 TABLET ORAL DAILY
Qty: 90 TABLET | Refills: 4 | Status: SHIPPED | OUTPATIENT
Start: 2025-08-14

## 2025-08-14 RX ORDER — ERGOCALCIFEROL 1.25 MG/1
CAPSULE, LIQUID FILLED ORAL
COMMUNITY
Start: 2025-06-23 | End: 2025-08-14

## (undated) DEVICE — SYR ANGIOGRAPHY MULTIUSE KIT ACIST 014612

## (undated) DEVICE — MANIFOLD KIT ANGIO AUTOMATED 014613

## (undated) DEVICE — VALVE HEMOSTASIS .096" COPILOT MECH 1003331

## (undated) DEVICE — CATH LAUNCHER 6FR EBU 3.5 LA6EBU35

## (undated) DEVICE — GUIDEWIRE VASCULAR COMET II 185CML PRESSURE H74939359110

## (undated) DEVICE — CATH 6FR X 100CM, OPTITORQUE C

## (undated) DEVICE — SHTH INTRO 0.021IN ID 6FR DIA

## (undated) DEVICE — SLEEVE TR BAND RADIAL COMPRESSION DEVICE 24CM TRB24-REG

## (undated) DEVICE — KIT HAND CONTROL ACIST 014644 AR-P54

## (undated) DEVICE — ELECTRODE DEFIB CADENCE 22550R

## (undated) DEVICE — EXCHANGE WIRE .035 260 STAR/JFC/035/260/ M001491681

## (undated) DEVICE — CUSTOM PACK CORONARY SAN5BCRHEA

## (undated) RX ORDER — DIAZEPAM 5 MG/1
TABLET ORAL
Status: DISPENSED
Start: 2025-01-31

## (undated) RX ORDER — FENTANYL CITRATE 50 UG/ML
INJECTION, SOLUTION INTRAMUSCULAR; INTRAVENOUS
Status: DISPENSED
Start: 2025-01-31